# Patient Record
Sex: FEMALE | Race: OTHER | Employment: OTHER | URBAN - METROPOLITAN AREA
[De-identification: names, ages, dates, MRNs, and addresses within clinical notes are randomized per-mention and may not be internally consistent; named-entity substitution may affect disease eponyms.]

---

## 2019-11-18 ENCOUNTER — HOSPITAL ENCOUNTER (OUTPATIENT)
Dept: LAB | Age: 84
Discharge: HOME | End: 2019-11-18
Payer: MEDICARE

## 2019-11-18 ENCOUNTER — OFFICE VISIT (OUTPATIENT)
Dept: FAMILY MEDICINE CLINIC | Age: 84
End: 2019-11-18

## 2019-11-18 VITALS
DIASTOLIC BLOOD PRESSURE: 66 MMHG | SYSTOLIC BLOOD PRESSURE: 130 MMHG | HEIGHT: 59 IN | BODY MASS INDEX: 26.25 KG/M2 | HEART RATE: 70 BPM | WEIGHT: 130.2 LBS | OXYGEN SATURATION: 98 % | TEMPERATURE: 97.5 F | RESPIRATION RATE: 16 BRPM

## 2019-11-18 DIAGNOSIS — L84 CORN: ICD-10-CM

## 2019-11-18 DIAGNOSIS — Z95.0 PACEMAKER: ICD-10-CM

## 2019-11-18 DIAGNOSIS — R35.0 URINARY FREQUENCY: ICD-10-CM

## 2019-11-18 DIAGNOSIS — R76.8 ELEVATED ANTINUCLEAR ANTIBODY (ANA) LEVEL: ICD-10-CM

## 2019-11-18 DIAGNOSIS — I73.9 PERIPHERAL VASCULAR DISEASE (HCC): ICD-10-CM

## 2019-11-18 DIAGNOSIS — N39.41 URGE INCONTINENCE OF URINE: ICD-10-CM

## 2019-11-18 DIAGNOSIS — R52 PAIN OF MULTIPLE SITES: ICD-10-CM

## 2019-11-18 DIAGNOSIS — M54.17 LUMBOSACRAL RADICULOPATHY: ICD-10-CM

## 2019-11-18 DIAGNOSIS — M51.36 LUMBAR DEGENERATIVE DISC DISEASE: ICD-10-CM

## 2019-11-18 DIAGNOSIS — M21.961 DEFORMITY OF RIGHT FOOT: ICD-10-CM

## 2019-11-18 DIAGNOSIS — M79.604 PAIN OF RIGHT LOWER EXTREMITY: ICD-10-CM

## 2019-11-18 DIAGNOSIS — Z86.73 H/O: STROKE: ICD-10-CM

## 2019-11-18 DIAGNOSIS — Z87.81 H/O COMPRESSION FRACTURE OF SPINE: ICD-10-CM

## 2019-11-18 DIAGNOSIS — R35.0 URINARY FREQUENCY: Primary | ICD-10-CM

## 2019-11-18 DIAGNOSIS — H35.30 MACULAR DEGENERATION OF RIGHT EYE, UNSPECIFIED TYPE: ICD-10-CM

## 2019-11-18 DIAGNOSIS — Z79.01 CURRENT USE OF LONG TERM ANTICOAGULATION: ICD-10-CM

## 2019-11-18 DIAGNOSIS — Z87.39 H/O FIBROMYALGIA: ICD-10-CM

## 2019-11-18 DIAGNOSIS — I48.20 CHRONIC A-FIB (HCC): ICD-10-CM

## 2019-11-18 DIAGNOSIS — Z90.49 H/O TOTAL COLECTOMY: ICD-10-CM

## 2019-11-18 DIAGNOSIS — Z86.59 H/O: DEPRESSION: ICD-10-CM

## 2019-11-18 LAB
BILIRUB UR QL STRIP: NEGATIVE
ERYTHROCYTE [SEDIMENTATION RATE] IN BLOOD: 5 MM/HR (ref 0–30)
GLUCOSE UR-MCNC: NEGATIVE MG/DL
INR PPP: 1.7 (ref 0.8–1.2)
KETONES P FAST UR STRIP-MCNC: NORMAL MG/DL
PH UR STRIP: 6 [PH] (ref 4.6–8)
PROT UR QL STRIP: NORMAL
PROTHROMBIN TIME: 20 SEC (ref 11.5–15.2)
SP GR UR STRIP: 1.02 (ref 1–1.03)
UA UROBILINOGEN AMB POC: NORMAL (ref 0.2–1)
URINALYSIS CLARITY POC: NORMAL
URINALYSIS COLOR POC: NORMAL
URINE BLOOD POC: NORMAL
URINE LEUKOCYTES POC: NORMAL
URINE NITRITES POC: NEGATIVE

## 2019-11-18 PROCEDURE — 85610 PROTHROMBIN TIME: CPT

## 2019-11-18 PROCEDURE — 86038 ANTINUCLEAR ANTIBODIES: CPT

## 2019-11-18 PROCEDURE — 36415 COLL VENOUS BLD VENIPUNCTURE: CPT

## 2019-11-18 PROCEDURE — 87086 URINE CULTURE/COLONY COUNT: CPT

## 2019-11-18 PROCEDURE — 85652 RBC SED RATE AUTOMATED: CPT

## 2019-11-18 RX ORDER — BISMUTH SUBSALICYLATE 262 MG
1 TABLET,CHEWABLE ORAL DAILY
COMMUNITY

## 2019-11-18 RX ORDER — ACETAMINOPHEN 500 MG
500 TABLET ORAL DAILY
COMMUNITY
End: 2020-01-01 | Stop reason: SDUPTHER

## 2019-11-18 RX ORDER — WARFARIN SODIUM 5 MG/1
TABLET ORAL
COMMUNITY
Start: 2019-09-26 | End: 2019-11-18 | Stop reason: SDUPTHER

## 2019-11-18 RX ORDER — WARFARIN SODIUM 5 MG/1
5 TABLET ORAL DAILY
Qty: 30 TAB | Refills: 2 | Status: SHIPPED | OUTPATIENT
Start: 2019-11-18 | End: 2019-11-25 | Stop reason: SDUPTHER

## 2019-11-18 RX ORDER — DICLOFENAC SODIUM 10 MG/G
4 GEL TOPICAL DAILY
COMMUNITY
Start: 2019-10-03

## 2019-11-18 RX ORDER — FLUTICASONE PROPIONATE 50 MCG
2 SPRAY, SUSPENSION (ML) NASAL DAILY
COMMUNITY
Start: 2019-09-26 | End: 2020-01-01

## 2019-11-18 NOTE — PATIENT INSTRUCTIONS
Topiramate (By mouth) Topiramate (toe-PIR-a-mate) Treats and prevents seizures, and helps prevent migraine headaches. Brand Name(s): Qudexy XR, Topamax, Trokendi XR There may be other brand names for this medicine. When This Medicine Should Not Be Used: This medicine is not right for everyone. Do not use it if you had an allergic reaction to topiramate, or if you are pregnant. How to Use This Medicine:  
Capsule, Long Acting Capsule, Tablet · Take your medicine as directed. Your dose may need to be changed several times to find what works best for you. · Tablet: Swallow whole. Do not break, crush, or chew the tablet. It has a very bitter taste. · Capsule or extended-release capsule: Do not crush or chew the capsule. Swallow whole or open the capsule and pour the medicine into a small amount (1 teaspoon) of soft food, such as applesauce. Swallow the mixture right away without chewing. Do not store the mixture for use at a later time. · Drink extra fluids so you will urinate more often and help prevent kidney problems. · This medicine should come with a Medication Guide. Ask your pharmacist for a copy if you do not have one. · Missed dose: Take a dose as soon as you remember. If it is almost time for your next dose, wait until then and take a regular dose. Do not take extra medicine to make up for a missed dose. If you miss a dose or forget to use your medicine, use it as soon as you can. If your next regular dose of Topamax® is less than 6 hours away, wait until then to use the medicine and skip the missed dose. If you miss more than 1 dose of Topamax®, call your doctor for instructions. · Store the medicine in a closed container at room temperature, away from heat, moisture, and direct light. Drugs and Foods to Avoid: Ask your doctor or pharmacist before using any other medicine, including over-the-counter medicines, vitamins, and herbal products. · Do not drink alcohol with Qudexy XR or Topamax®. Do not drink alcohol for 6 hours before and 6 hours after you take the Trokendi XR capsule. · Some medicines can affect how topiramate works. Tell your doctor if you are using acetazolamide, dichlorphenamide, dichloralphenazone, digoxin, lithium, metformin, zonisamide, other medicine for seizures (such as carbamazepine, phenytoin, valproic acid), or birth control pills. · Tell your doctor if you are using any medicine that makes you sleepy, such as allergy medicine or narcotic pain medicine. Warnings While Using This Medicine: · It is not safe to take this medicine during pregnancy. It could harm an unborn baby. Tell your doctor right away if you become pregnant. · Tell your doctor if you are breastfeeding, or if you have kidney disease, liver disease, glaucoma, lung or breathing problems, osteoporosis, or a history of depression or mood disorders. Tell your doctor if you are on a ketogenic diet (high in fat and low in carbohydrates). · This medicine may cause the following problems: ¨ Eye pain or vision changes, including glaucoma ¨ Changes in body temperature ¨ Metabolic acidosis (too much acid in the blood) ¨ Kidney stones · This medicine may increase depression or thoughts of suicide. Tell your doctor right away if you start to feel more depressed or think about hurting yourself. · This medicine may make you dizzy, drowsy, or tired. Do not drive or do anything else that could be dangerous until you know how this medicine affects you. · Do not stop using this medicine suddenly. Your doctor will need to slowly decrease your dose before you stop it completely. · Your doctor will do lab tests at regular visits to check on the effects of this medicine. Keep all appointments. · Keep all medicine out of the reach of children. Never share your medicine with anyone. Possible Side Effects While Using This Medicine: Call your doctor right away if you notice any of these side effects: · Allergic reaction: Itching or hives, swelling in your face or hands, swelling or tingling in your mouth or throat, chest tightness, trouble breathing · Bloody or cloudy urine, painful urination, sudden lower back or stomach pain · Changes in vision, eye pain · Confusion, problems with walking, clumsiness, dizziness, or trouble talking, concentrating, or remembering · Feeling agitated, depressed, nervous, or irritable, thoughts of hurting yourself or others, unusual mood or behavior · Fever, decreased sweating · Numbness, tingling, or burning pain in your hands, arms, legs, or feet · Rapid, deep breathing, loss of appetite, fast or uneven heartbeat · Vomiting, unusual drowsiness, tiredness, or weakness If you notice these less serious side effects, talk with your doctor: · Change in taste · Nausea, diarrhea · Stuffy or runny nose · Weight loss If you notice other side effects that you think are caused by this medicine, tell your doctor. Call your doctor for medical advice about side effects. You may report side effects to FDA at 0-824-FDA-8664 © 2017 [facility]– [place] Information is for End User's use only and may not be sold, redistributed or otherwise used for commercial purposes. The above information is an  only. It is not intended as medical advice for individual conditions or treatments. Talk to your doctor, nurse or pharmacist before following any medical regimen to see if it is safe and effective for you. Atrial Fibrillation: Care Instructions Your Care Instructions Atrial fibrillation is an irregular and often fast heartbeat. Treating this condition is important for several reasons. It can cause blood clots, which can travel from your heart to your brain and cause a stroke. If you have a fast heartbeat, you may feel lightheaded, dizzy, and weak.  An irregular heartbeat can also increase your risk for heart failure. Atrial fibrillation is often the result of another heart condition, such as high blood pressure or coronary artery disease. Making changes to improve your heart condition will help you stay healthy and active. Follow-up care is a key part of your treatment and safety. Be sure to make and go to all appointments, and call your doctor if you are having problems. It's also a good idea to know your test results and keep a list of the medicines you take. How can you care for yourself at home? Medicines 
  · Take your medicines exactly as prescribed. Call your doctor if you think you are having a problem with your medicine. You will get more details on the specific medicines your doctor prescribes.  
  · If your doctor has given you a blood thinner to prevent a stroke, be sure you get instructions about how to take your medicine safely. Blood thinners can cause serious bleeding problems.  
  · Do not take any vitamins, over-the-counter drugs, or herbal products without talking to your doctor first.  
 Lifestyle changes 
  · Do not smoke. Smoking can increase your chance of a stroke and heart attack. If you need help quitting, talk to your doctor about stop-smoking programs and medicines. These can increase your chances of quitting for good.  
  · Eat a heart-healthy diet.  
  · Stay at a healthy weight. Lose weight if you need to.  
  · Limit alcohol to 2 drinks a day for men and 1 drink a day for women. Too much alcohol can cause health problems.  
  · Avoid colds and flu. Get a pneumococcal vaccine shot. If you have had one before, ask your doctor whether you need another dose. Get a flu shot every year. If you must be around people with colds or flu, wash your hands often. Activity 
  · If your doctor recommends it, get more exercise. Walking is a good choice. Bit by bit, increase the amount you walk every day.  Try for at least 30 minutes on most days of the week. You also may want to swim, bike, or do other activities. Your doctor may suggest that you join a cardiac rehabilitation program so that you can have help increasing your physical activity safely.  
  · Start light exercise if your doctor says it is okay. Even a small amount will help you get stronger, have more energy, and manage stress. Walking is an easy way to get exercise. Start out by walking a little more than you did in the hospital. Gradually increase the amount you walk.  
  · When you exercise, watch for signs that your heart is working too hard. You are pushing too hard if you cannot talk while you are exercising. If you become short of breath or dizzy or have chest pain, sit down and rest immediately.  
  · Check your pulse regularly. Place two fingers on the artery at the palm side of your wrist, in line with your thumb. If your heartbeat seems uneven or fast, talk to your doctor. When should you call for help? Call 911 anytime you think you may need emergency care. For example, call if: 
  · You have symptoms of a heart attack. These may include: 
? Chest pain or pressure, or a strange feeling in the chest. 
? Sweating. ? Shortness of breath. ? Nausea or vomiting. ? Pain, pressure, or a strange feeling in the back, neck, jaw, or upper belly or in one or both shoulders or arms. ? Lightheadedness or sudden weakness. ? A fast or irregular heartbeat. After you call 911, the  may tell you to chew 1 adult-strength or 2 to 4 low-dose aspirin. Wait for an ambulance. Do not try to drive yourself.  
  · You have symptoms of a stroke. These may include: 
? Sudden numbness, tingling, weakness, or loss of movement in your face, arm, or leg, especially on only one side of your body. ? Sudden vision changes. ? Sudden trouble speaking. ? Sudden confusion or trouble understanding simple statements. ? Sudden problems with walking or balance. ? A sudden, severe headache that is different from past headaches.  
  · You passed out (lost consciousness).  
 Call your doctor now or seek immediate medical care if: 
  · You have new or increased shortness of breath.  
  · You feel dizzy or lightheaded, or you feel like you may faint.  
  · Your heart rate becomes irregular.  
  · You can feel your heart flutter in your chest or skip heartbeats. Tell your doctor if these symptoms are new or worse.  
 Watch closely for changes in your health, and be sure to contact your doctor if you have any problems. Where can you learn more? Go to http://pedro-abiodun.info/. Enter U020 in the search box to learn more about \"Atrial Fibrillation: Care Instructions. \" Current as of: April 9, 2019 Content Version: 12.2 © 1960-0259 [employer]. Care instructions adapted under license by [employer] (which disclaims liability or warranty for this information). If you have questions about a medical condition or this instruction, always ask your healthcare professional. [address] any warranty or liability for your use of this information. Heart and Pacemaker: Anatomy Sketch Current as of: April 9, 2019 Content Version: 12.2 © 9925-7722 [employer]. Care instructions adapted under license by [employer] (which disclaims liability or warranty for this information). If you have questions about a medical condition or this instruction, always ask your healthcare professional. [address] any warranty or liability for your use of this information. Duloxetine (By mouth) Duloxetine (doo-LOX-e-teen) Treats depression, anxiety, diabetic peripheral neuropathy, fibromyalgia, and chronic muscle or bone pain. This medicine is an SSNRI. Brand Name(s): Cymbalta, DermacinRx DPN Aleene Lob There may be other brand names for this medicine. When This Medicine Should Not Be Used: This medicine is not right for everyone. Do not use it if you had an allergic reaction to duloxetine. How to Use This Medicine:  
Capsule, Delayed Release Capsule · Take your medicine as directed. Your dose may need to be changed several times to find what works best for you. · Delayed-release capsule: Swallow the capsule whole. Do not crush, chew, break, or open it. · This medicine should come with a Medication Guide. Ask your pharmacist for a copy if you do not have one. · Missed dose: Take a dose as soon as you remember. If it is almost time for your next dose, wait until then and take a regular dose. Do not take extra medicine to make up for a missed dose. · Store the medicine in a closed container at room temperature, away from heat, moisture, and direct light. Drugs and Foods to Avoid: Ask your doctor or pharmacist before using any other medicine, including over-the-counter medicines, vitamins, and herbal products. · Do not take duloxetine if you have used an MAO inhibitor (MAOI) within the past 14 days. Do not start taking an MAO inhibitor within 5 days of stopping duloxetine. · Some medicines can affect how duloxetine works. Tell your doctor if you are using any of the following: 
¨ Buspirone, cimetidine, ciprofloxacin, enoxacin, fentanyl, lithium, Karen's wort, theophylline, tramadol, tryptophan, or warfarin ¨ Amphetamines ¨ Blood pressure medicine ¨ Diuretic (water pill) ¨ Medicine for heart rhythm problems (including flecainide, propafenone, quinidine) ¨ Medicine to treat migraine headaches (including triptans) ¨ NSAID pain or arthritis medicine (including aspirin, celecoxib, diclofenac, ibuprofen, naproxen) ¨ Other medicine to treat depression or mood disorders (including amitriptyline, desipramine, fluoxetine, imipramine, nortriptyline, paroxetine) ¨ Phenothiazine medicine (including thioridazine) · Tell your doctor if you use anything else that makes you sleepy. Some examples are allergy medicine, narcotic pain medicine, and alcohol. · Do not drink alcohol while you are using this medicine. Warnings While Using This Medicine: · Tell your doctor if you are pregnant or breastfeeding, or if you have kidney disease, liver disease, diabetes, digestion problems, glaucoma, heart disease, high or low blood pressure, or problems with urination. Tell your doctor if you smoke or you have a history of seizures, or drug or alcohol addiction. · This medicine may cause the following problems:  
¨ Serious liver problems ¨ Serotonin syndrome (more likely when used with certain other medicines) ¨ Increased risk of bleeding problems ¨ Serious skin reactions ¨ Low sodium levels in the blood · This medicine can increase thoughts of suicide. Tell your doctor right away if you start to feel depressed and have thoughts about hurting yourself. · This medicine can cause changes in your blood pressure. This may make you dizzy or drowsy. Do not drive or do anything that could be dangerous until you know how this medicine affects you. Stand up slowly to avoid falls. · Do not stop using this medicine suddenly. Your doctor will need to slowly decrease your dose before you stop it completely. · Your doctor will check your progress and the effects of this medicine at regular visits. Keep all appointments. · Keep all medicine out of the reach of children. Never share your medicine with anyone. Possible Side Effects While Using This Medicine:  
Call your doctor right away if you notice any of these side effects: · Allergic reaction: Itching or hives, swelling in your face or hands, swelling or tingling in your mouth or throat, chest tightness, trouble breathing · Anxiety, restlessness, fever, fast heartbeat, sweating, muscle spasms, diarrhea, seeing or hearing things that are not there · Blistering, peeling, red skin rash · Confusion, weakness, muscle twitching · Dark urine or pale stools, nausea, vomiting, loss of appetite, stomach pain, yellow skin or eyes · Decrease in how much or how often you urinate · Eye pain, vision changes, seeing halos around lights · Feeling more energetic than usual 
· Lightheadedness, dizziness, or fainting · Unusual moods or behaviors, worsening depression, thoughts about hurting yourself, trouble sleeping · Unusual bleeding or bruising If you notice these less serious side effects, talk with your doctor: · Decrease in appetite or weight · Dry mouth, constipation, mild nausea · Unusual drowsiness, sleepiness, or tiredness If you notice other side effects that you think are caused by this medicine, tell your doctor. Call your doctor for medical advice about side effects. You may report side effects to FDA at 4-712-FDA-2584 © 2017 [facility] Information is for End User's use only and may not be sold, redistributed or otherwise used for commercial purposes. The above information is an  only. It is not intended as medical advice for individual conditions or treatments. Talk to your doctor, nurse or pharmacist before following any medical regimen to see if it is safe and effective for you.

## 2019-11-18 NOTE — PROGRESS NOTES
HISTORY OF PRESENT ILLNESS Tarah Luis is a 80 y.o. female. HPI: new patient. Multiple medical problem. Accompanied with her daughter in law. Moved a week ago from [place]. Some brief summery of last note from prior PCP. Still no formal records available. Her  passed away a week ago and he was primary care giver for pt so now she moved with her son and daughter in law. 
 said she has urinary frequency. No urgency or burning but having incontinences . Said she always had weak bladder. Not taking any medication for current problem UA done during office visit showed +1 leukocytes and blood. Negative nitrates . Will send urine for culture . Will observe for now and if needed can start ditropan. Pt is not a good historian. Now on coumadin. Per prior records noted she has pacemaker and h/o a.fib. Prior h/o stroke. No residual weakness in ext. Last coumadin level was checked a week ago. Now taking 7.5 mg twice a week and rest of the week 5 mg. Will recheck labs. Also done cardiology referral to get establish care. She has rt foot deformity. Callus under 2nd and 3rd toe. Asking for podiatry referral.  
Also chronic lower back pain and radiculopathy over rt lower ext. Review MRI of lumbar spine report which was provided by pt during visit. Also prior h/o compression fracture L1,L2 from fall. Not sure she is on any treatment for osteoporosis or any prior dexa scan done. Will obtain records. Mean time also sending her to rheumatology as she is feeling pain over multiple sites. Also prior h/o elevated TERESITA. During visit no joint pain or swelling. Will repeat labs as well. Some fatigue but said not unusual . Also per chart noted carotid artery disease. She does not recall any carotid ultrasound. Again will obtain records. I do not see she is on statin. Prior h/o stroke? ? As prevention. Not sure why ?? Noted h/o depression and fibromyalgia. Some sleep discomfort due to recently lost her . Will observe. Not on any medication for depression. Discussed cymbalta. She will think about it. Also c/o rt lower ext radiculopathy. Also discussed that can be help with cymbalta. ?? 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 Surgery due to h/o colitis. Agree to reestablish care with colo rectal specialist.  
 
Visit Vitals /66 (BP 1 Location: Left arm, BP Patient Position: Sitting) Pulse 70 Temp 97.5 °F (36.4 °C) (Oral) Resp 16 Ht 4' 11\" (1.499 m) Wt 130 lb 3.2 oz (59.1 kg) SpO2 98% BMI 26.30 kg/m² Review medication list, vitals, problem list,allergies. Sitting on a chair. Not in an acute distress. Denies any headache, dizziness, no chest pain or trouble breathing, no arm or leg weakness. No nausea or vomiting, no weight or appetite changes, no mood changes . No urine or bowel complains, no palpitation, no diaphoresis. No abdominal pain. No cold or cough. No leg swelling. No fever. No sleep trouble. ROS: see HPI Physical Exam  
Constitutional: No distress. Neck: No thyromegaly present. Cardiovascular: Normal heart sounds. Pulmonary/Chest: No respiratory distress. She has no wheezes. Abdominal: Soft. There is no tenderness. Musculoskeletal: She exhibits no edema. generalize  discomfort over lumbosacral spine. No point tenderness. Bilateral paraspinal muscle discomfort present both side. ROM limited. ambulating with support Lymphadenopathy:  
  She has no cervical adenopathy. Neurological: She is alert. Psychiatric: Her behavior is normal.  
 
 
ASSESSMENT and PLAN 
  ICD-10-CM ICD-9-CM 1. Urinary frequency: UA did not show acute signs of infection. Will send for culture. Mean time drink more fluid. R35.0 788.41 AMB POC URINALYSIS DIP STICK AUTO W/O MICRO  
   CULTURE, URINE  
2.  Urge incontinence of urine N39.41 788.31   
 3. Peripheral vascular disease [facility]): for now will obtain prior ultrasound records. I73.9 443.9 REFERRAL TO CARDIOLOGY  
 carotid artery disorder 4. Chronic a-fib: on anticoagulation. Will be sending her to cardiology as also has pacemaker. Will also obtain prior records. Checking PT/ INR. Last check was one month ago was 1.7. I48.20 427.31 REFERRAL TO CARDIOLOGY PROTHROMBIN TIME + INR  
5. H/O: stroke: not on statin. No residual weakness. Will obtain records to obtain reason why not statin for secondary prevention. Z86.73 V12.54 REFERRAL TO CARDIOLOGY 6. Pacemaker: last was checked in April. She has card and now time to recheck. Will follow cardiology recommendations. Z95.0 V45.01 REFERRAL TO CARDIOLOGY 7. Current use of long term anticoagulation Z79.01 V58.61 REFERRAL TO CARDIOLOGY PROTHROMBIN TIME + INR 8. Lumbar degenerative disc disease: for now sending to spine specialist as chronic pain and radiculopathy. For now also discussed Topamax and Cymbalta for symptomatic treatment. . She will think about it. M51.36 722.52 REFERRAL TO SPINE SURGERY  
 h/o compression fracture L1, L2.   
9. Pain of multiple sites R52 780.96 REFERRAL TO RHEUMATOLOGY ANTINUCLEAR ANTIBODIES, IFA  
   SED RATE (ESR)  
 h/o elevated TERESITA. multiple compression fracture over her lumbar spine. 10. Pain of right lower extremity: ? Radiculopathy. For now discussed heating pad over lumbar spine. M79.604 729.5 REFERRAL TO PODIATRY REFERRAL TO RHEUMATOLOGY 11. Deformity of right foot: sending to podiatry. Inverted toes and callus below toe. M21.961 736.70 REFERRAL TO PODIATRY prior procedures done  per prior records. no full record available . 12. Lumbosacral radiculopathy: symptomatic treatment discussed . see above. M54.17 724.4   
 rt lower ext burning. started 2  
13. [place]: sending to podiatry. L84 700 REFERRAL TO PODIATRY  
 rt foot. 14. H/O total colectomy: due to colitis. For now sending to colorectal surgeon to reestablish care locally. Z90.49 V45.89 REFERRAL TO COLON AND RECTAL SURGERY  
 has colostomy  bag 15. Macular degeneration of right eye, unspecified type: sending to ophthalmology to establish care. H35.30 362.50 REFERRAL TO OPHTHALMOLOGY 16. H/O fibromyalgia: was given option of cymbalta. She will think about it. Z87.39 V13.59 REFERRAL TO RHEUMATOLOGY 16. H/O: depression: recently lost her . For now will observe. Discussed Cymbalta. She will think about it  Z86.59 V11.8 18. Elevated antinuclear antibody (TERESITA) level: repeat labs. Per prior records past history of elevated TERESITA>  R76.8 795.79 REFERRAL TO RHEUMATOLOGY ANTINUCLEAR ANTIBODIES, IFA  
   SED RATE (ESR) 19. H/O compression fracture of spine: for now sending to spine center. Mean time as needed otc medication. Heating pad. Z87.81 V15.51 REFERRAL TO RHEUMATOLOGY Pt understood and agree with the plan Review HM Follow-up and Dispositions · Return in about 1 month (around 12/18/2019).

## 2019-11-19 LAB — ANA TITR SER IF: NEGATIVE {TITER}

## 2019-11-19 NOTE — PROGRESS NOTES
Called and spoke with Manolo Calzada (on [facility]) and she verbalized patient's name and date of birth. She verbalized understanding of Coumadin 7.5 mg M, W, F and 5 mg remaining days. Referral to coumadin clinic for further monitoring.

## 2019-11-19 NOTE — PROGRESS NOTES
Ask pt to take 7.5 mg coumadin 3 times a week and rest of the week 5 mg. Monday, Wednesday and Friday. Also recheck in a week. Please try to get sooner appt with cardiology for pacemaker check and coumadin check. Thanks.

## 2019-11-20 ENCOUNTER — TELEPHONE (OUTPATIENT)
Dept: FAMILY MEDICINE CLINIC | Age: 84
End: 2019-11-20

## 2019-11-20 LAB
BACTERIA SPEC CULT: ABNORMAL
SERVICE CMNT-IMP: ABNORMAL

## 2019-11-20 NOTE — TELEPHONE ENCOUNTER
Pt's coumadin has been changed to 7.5 mg mon -wed and fri and 5mg on other  Days . Need to call pharmacy to change dose for medicare purposes and the pt's daughter in law would like 80 day supply. Pt won't see cardiologist for over a month.

## 2019-11-25 ENCOUNTER — TELEPHONE (OUTPATIENT)
Dept: FAMILY MEDICINE CLINIC | Age: 84
End: 2019-11-25

## 2019-11-25 DIAGNOSIS — Z79.01 LONG TERM (CURRENT) USE OF ANTICOAGULANTS: Primary | ICD-10-CM

## 2019-11-25 RX ORDER — WARFARIN 2.5 MG/1
2.5 TABLET ORAL DAILY
Qty: 48 TAB | Refills: 0 | Status: SHIPPED | OUTPATIENT
Start: 2019-11-25 | End: 2020-01-01

## 2019-11-25 RX ORDER — WARFARIN SODIUM 5 MG/1
5 TABLET ORAL DAILY
Qty: 90 TAB | Refills: 2 | Status: SHIPPED | OUTPATIENT
Start: 2019-11-25 | End: 2020-01-01

## 2019-11-25 NOTE — PROGRESS NOTES
Called patient and left message for daughter in Romayne Riding to please call office at convenience regarding results.

## 2019-11-25 NOTE — PROGRESS NOTES
Called patient and left message for daughter in law to please call office at convenience regarding results.

## 2019-11-25 NOTE — TELEPHONE ENCOUNTER
Spoke with patient's daughter in law and she verbalized understanding of Rx to pharmacy and to re-check in 1 week. She indicated she will get patient to lab as close to the date as possible due to holiday travel.

## 2019-12-05 ENCOUNTER — HOSPITAL ENCOUNTER (OUTPATIENT)
Dept: LAB | Age: 84
Discharge: HOME | End: 2019-12-05
Payer: MEDICARE

## 2019-12-05 DIAGNOSIS — Z79.01 LONG TERM (CURRENT) USE OF ANTICOAGULANTS: ICD-10-CM

## 2019-12-05 LAB
INR PPP: 2.1 (ref 0.8–1.2)
PROTHROMBIN TIME: 23.5 SEC (ref 11.5–15.2)

## 2019-12-05 PROCEDURE — 85610 PROTHROMBIN TIME: CPT

## 2019-12-05 PROCEDURE — 36415 COLL VENOUS BLD VENIPUNCTURE: CPT

## 2019-12-06 NOTE — PROGRESS NOTES
INR in theraputic range. Continue current dose of coumadin. Now noted from the chart she has an appt with cardiology on dec 11th. Follow their recommendations after their appt regarding coumadin management.

## 2019-12-10 NOTE — PROGRESS NOTES
Call received from patient's daughter-in-law, Essence Hobson. She was advised that patient's INR was in theraputic range. Patient to continue current dose of coumadin and to keep appt with cardiology on dec 11th. Follow their recommendations after their appt regarding coumadin management. Essence Hobson voices understanding.

## 2019-12-11 ENCOUNTER — OFFICE VISIT (OUTPATIENT)
Dept: CARDIOLOGY CLINIC | Age: 84
End: 2019-12-11

## 2019-12-11 ENCOUNTER — CLINICAL SUPPORT (OUTPATIENT)
Dept: CARDIOLOGY CLINIC | Age: 84
End: 2019-12-11

## 2019-12-11 VITALS
OXYGEN SATURATION: 96 % | BODY MASS INDEX: 26.61 KG/M2 | HEIGHT: 59 IN | HEART RATE: 78 BPM | WEIGHT: 132 LBS | DIASTOLIC BLOOD PRESSURE: 90 MMHG | SYSTOLIC BLOOD PRESSURE: 142 MMHG

## 2019-12-11 DIAGNOSIS — Z95.0 PACEMAKER: Primary | ICD-10-CM

## 2019-12-11 DIAGNOSIS — I48.91 ATRIAL FIBRILLATION, UNSPECIFIED TYPE (HCC): ICD-10-CM

## 2019-12-11 DIAGNOSIS — M47.9 OSTEOARTHRITIS OF SPINE, UNSPECIFIED SPINAL OSTEOARTHRITIS COMPLICATION STATUS, UNSPECIFIED SPINAL REGION: ICD-10-CM

## 2019-12-11 DIAGNOSIS — Z79.01 ANTICOAGULANT LONG-TERM USE: ICD-10-CM

## 2019-12-11 NOTE — PROGRESS NOTES
Barry Sy presents today for Chief Complaint Patient presents with  Irregular Heart Beat REFERRED BY PCP. ESTABLISHING WITH NEW CARDIOLOGIST. PREVIOUS CARDIOLOGIST IN [place]. [employer] PRESENT  Shortness of Breath Barry Sy preferred language for health care discussion is english/other. Is someone accompanying this pt? yes Is the patient using any DME equipment during OV? no 
 
Depression Screening: 
3 most recent PHQ Screens 11/18/2019 Little interest or pleasure in doing things Not at all Feeling down, depressed, irritable, or hopeless Not at all Total Score PHQ 2 0 Learning Assessment: 
Learning Assessment 11/18/2019 PRIMARY LEARNER Patient HIGHEST LEVEL OF EDUCATION - PRIMARY LEARNER  GRADUATED HIGH SCHOOL OR GED  
BARRIERS PRIMARY LEARNER [id number] [facility] CAREGIVER Yes CO-LEARNER NAME Monty Pang / Daughter-in- law R Carolyn Miller 75 SOME COLLEGE  
BARRIERS CO-LEARNER NONE PRIMARY LANGUAGE ENGLISH  
PRIMARY LANGUAGE CO-LEARNER ENGLISH  NEED No  
LEARNER PREFERENCE PRIMARY LISTENING  
  READING  
LEARNER PREFERENCE CO-LEARNER LISTENING  
LEARNING SPECIAL TOPICS No  
ANSWERED BY patient RELATIONSHIP SELF Abuse Screening: 
Abuse Screening Questionnaire 11/18/2019 Do you ever feel afraid of your partner? Randal Lek Are you in a relationship with someone who physically or mentally threatens you? Randal Lek Is it safe for you to go home? Phani Garrett Fall Risk Fall Risk Assessment, last 12 mths 11/18/2019 Able to walk? Yes Fall in past 12 months? Yes Fall with injury? Yes  
Number of falls in past 12 months 6 Fall Risk Score 7 Pt currently taking Anticoagulant therapy? yes Coordination of Care: 1. Have you been to the ER, urgent care clinic since your last visit? Hospitalized since your last visit? no 
 
2.  Have you seen or consulted any other health care providers outside of the [address] [place] [address] since your last visit? Include any pap smears or colon screening. yes

## 2019-12-11 NOTE — PROGRESS NOTES
HPI: An 80-year old female here to establish care. She is accompanied by her daughter-in-law. I also care for her son. She has no new chest pain, dyspnea, PND, orthopnea or edema. Her  passed away earlier this year and she has moved to the area and living now with her son and daughter-in-law. She is doing well. She normally takes coumadin without bleeding issues. Impression/Plan: 1. Single chamber [address] 233 pacemaker approaching VOLODYMYR but not yet triggered. She paces approximately 45%. 2. Chronic atrial fibrillation on coumadin. 3. History of echocardiogram historically with normal function. 4. History of fibromyalgia. 5. Dyslipidemia. 6. Remote TIA. 7. History of diverticulitis and colectomy. 8. Gastroesophageal reflux disease. At this point we will plan to check her device monthly until she requires changeout. At that time we can switch to a [employer] and set up regular home checks. She is also on coumadin and we will plan to keep her INR between 2-3 and will set her up with the coumadin clinic. All questions answered. I will make arrangements. No past medical history on file. Current Outpatient Medications Medication Sig Dispense Refill  warfarin (COUMADIN) 2.5 mg tablet Take 1 Tab by mouth daily. Take one tab Monday-Wednesday along with 5 mg . Total 7.5 mg Monday- Wednesday. . Other days she will be on 5 mg 48 Tab 0  
 warfarin (COUMADIN) 5 mg tablet Take 1 Tab by mouth daily. Take Monday-Wednesday 7.5 mg and rest of the week 5 mg. 90 Tab 2  
 diclofenac (VOLTAREN) 1 % gel  fluticasone propionate (FLONASE) 50 mcg/actuation nasal spray  acetaminophen (TYLENOL EXTRA STRENGTH) 500 mg tablet Take  by mouth every six (6) hours as needed for Pain.  diphenhydramine HCl (BENADRYL PO) Take  by mouth.  multivitamin (ONE A DAY) tablet Take 1 Tab by mouth daily.  cholecalciferol, vitamin D3, (VITAMIN D3 PO) Take  by mouth.  OTHER OTC vision supplement Social History 
 reports that she has never smoked. She has never used smokeless tobacco. 
 reports no history of alcohol use. Family History 
family history includes Heart Failure in her father; Stroke in her mother. Review of Systems Except as stated above include: 
Constitutional: Negative for fever, chills and malaise/fatigue. HEENT: No congestion or recent URI. Gastrointestinal: No nausea, vomiting, abdominal pain, bloody stools. Pulmonary:  Negative except as stated above. Cardiac:  Negative except as stated above. Musculoskeletal: Negative except as stated above. Neurological:  No localized symptoms. Skin:  Negative except as stated above. Psych:  Negative except as stated above. Endocrine:  Negative except as stated above. PHYSICAL EXAM 
BP Readings from Last 3 Encounters:  
12/11/19 142/90  
11/18/19 130/66 Pulse Readings from Last 3 Encounters:  
12/11/19 78  
11/18/19 70 Wt Readings from Last 3 Encounters:  
12/11/19 59.9 kg (132 lb)  
11/18/19 59.1 kg (130 lb 3.2 oz) General:   Well developed, well groomed. Head/Neck:   No jugular venous distention No carotid bruits. No evidence of xanthelasma. Lungs:   No respiratory distress. Clear bilaterally. Heart:    Regular rate and rhythm. Normal S1/S2. Pacer pocket intact. Palpation of heart with normal point of maximum impulse. No significant murmurs, rubs or gallops. Abdomen:   Soft and nontender. No palpable abdominal mass or bruits. Extremities:   Intact peripheral pulses. No significant edema. Neurological:   Alert and oriented to person, place, time. No focal neurological deficit visually. Skin:   No obvious rash Blood Pressure Metric: 
Monitor recommended and adjustments stated if needed.

## 2019-12-27 NOTE — PROGRESS NOTES
I have personally seen and evaluated the device findings. Interrogation reviewed and I agree with assessment.     Jerson Storm

## 2020-01-01 ENCOUNTER — OFFICE VISIT (OUTPATIENT)
Dept: NEUROLOGY | Age: 85
End: 2020-01-01

## 2020-01-01 ENCOUNTER — TELEPHONE ANTICOAG (OUTPATIENT)
Dept: CARDIOLOGY CLINIC | Age: 85
End: 2020-01-01

## 2020-01-01 ENCOUNTER — HOME CARE VISIT (OUTPATIENT)
Dept: SCHEDULING | Facility: HOME HEALTH | Age: 85
End: 2020-01-01
Payer: MEDICARE

## 2020-01-01 ENCOUNTER — OFFICE VISIT (OUTPATIENT)
Dept: CARDIOLOGY CLINIC | Age: 85
End: 2020-01-01
Payer: MEDICARE

## 2020-01-01 ENCOUNTER — APPOINTMENT (OUTPATIENT)
Dept: PHYSICAL THERAPY | Age: 85
End: 2020-01-01

## 2020-01-01 ENCOUNTER — OFFICE VISIT (OUTPATIENT)
Dept: FAMILY MEDICINE CLINIC | Age: 85
End: 2020-01-01

## 2020-01-01 ENCOUNTER — VIRTUAL VISIT (OUTPATIENT)
Dept: NEUROLOGY | Age: 85
End: 2020-01-01

## 2020-01-01 ENCOUNTER — TELEPHONE (OUTPATIENT)
Dept: CARDIOLOGY CLINIC | Age: 85
End: 2020-01-01

## 2020-01-01 ENCOUNTER — TELEPHONE (OUTPATIENT)
Dept: FAMILY MEDICINE CLINIC | Age: 85
End: 2020-01-01

## 2020-01-01 ENCOUNTER — HOSPITAL ENCOUNTER (OUTPATIENT)
Dept: LAB | Age: 85
Discharge: HOME | End: 2020-12-14
Payer: MEDICARE

## 2020-01-01 ENCOUNTER — CLINICAL SUPPORT (OUTPATIENT)
Dept: CARDIOLOGY CLINIC | Age: 85
End: 2020-01-01

## 2020-01-01 ENCOUNTER — OFFICE VISIT (OUTPATIENT)
Dept: ORTHOPEDIC SURGERY | Age: 85
End: 2020-01-01

## 2020-01-01 ENCOUNTER — OFFICE VISIT (OUTPATIENT)
Dept: CARDIOLOGY CLINIC | Age: 85
End: 2020-01-01

## 2020-01-01 ENCOUNTER — HOSPITAL ENCOUNTER (OUTPATIENT)
Age: 85
Setting detail: OUTPATIENT SURGERY
Discharge: HOME | End: 2020-12-28
Attending: INTERNAL MEDICINE | Admitting: INTERNAL MEDICINE
Payer: MEDICARE

## 2020-01-01 ENCOUNTER — CLINICAL SUPPORT (OUTPATIENT)
Dept: CARDIOLOGY CLINIC | Age: 85
End: 2020-01-01
Payer: MEDICARE

## 2020-01-01 ENCOUNTER — HOME CARE VISIT (OUTPATIENT)
Dept: HOME HEALTH SERVICES | Facility: HOME HEALTH | Age: 85
End: 2020-01-01
Payer: MEDICARE

## 2020-01-01 ENCOUNTER — OFFICE VISIT (OUTPATIENT)
Dept: FAMILY MEDICINE CLINIC | Age: 85
End: 2020-01-01
Payer: MEDICARE

## 2020-01-01 ENCOUNTER — TELEPHONE (OUTPATIENT)
Dept: ORTHOPEDIC SURGERY | Age: 85
End: 2020-01-01

## 2020-01-01 ENCOUNTER — HOSPITAL ENCOUNTER (OUTPATIENT)
Dept: GENERAL RADIOLOGY | Age: 85
Discharge: HOME | End: 2020-12-14
Payer: MEDICARE

## 2020-01-01 VITALS
HEIGHT: 59 IN | WEIGHT: 119.5 LBS | BODY MASS INDEX: 24.09 KG/M2 | RESPIRATION RATE: 16 BRPM | TEMPERATURE: 98.1 F | HEART RATE: 74 BPM | SYSTOLIC BLOOD PRESSURE: 147 MMHG | OXYGEN SATURATION: 98 % | DIASTOLIC BLOOD PRESSURE: 79 MMHG

## 2020-01-01 VITALS
OXYGEN SATURATION: 97 % | BODY MASS INDEX: 23.99 KG/M2 | TEMPERATURE: 97.9 F | WEIGHT: 119 LBS | DIASTOLIC BLOOD PRESSURE: 72 MMHG | SYSTOLIC BLOOD PRESSURE: 120 MMHG | HEIGHT: 59 IN | RESPIRATION RATE: 16 BRPM | HEART RATE: 73 BPM

## 2020-01-01 VITALS
HEIGHT: 59 IN | HEART RATE: 64 BPM | SYSTOLIC BLOOD PRESSURE: 136 MMHG | DIASTOLIC BLOOD PRESSURE: 80 MMHG | TEMPERATURE: 98 F | BODY MASS INDEX: 26 KG/M2 | RESPIRATION RATE: 16 BRPM | OXYGEN SATURATION: 100 % | WEIGHT: 129 LBS

## 2020-01-01 VITALS
HEART RATE: 58 BPM | OXYGEN SATURATION: 100 % | HEIGHT: 59 IN | BODY MASS INDEX: 23.99 KG/M2 | DIASTOLIC BLOOD PRESSURE: 74 MMHG | SYSTOLIC BLOOD PRESSURE: 120 MMHG | WEIGHT: 119 LBS

## 2020-01-01 VITALS — SYSTOLIC BLOOD PRESSURE: 120 MMHG | OXYGEN SATURATION: 100 % | HEART RATE: 68 BPM | DIASTOLIC BLOOD PRESSURE: 70 MMHG

## 2020-01-01 VITALS
SYSTOLIC BLOOD PRESSURE: 134 MMHG | HEART RATE: 74 BPM | DIASTOLIC BLOOD PRESSURE: 69 MMHG | HEIGHT: 59 IN | BODY MASS INDEX: 25.4 KG/M2 | RESPIRATION RATE: 16 BRPM | TEMPERATURE: 97.5 F | OXYGEN SATURATION: 100 % | WEIGHT: 126 LBS

## 2020-01-01 VITALS — DIASTOLIC BLOOD PRESSURE: 72 MMHG | SYSTOLIC BLOOD PRESSURE: 130 MMHG | OXYGEN SATURATION: 94 % | HEART RATE: 78 BPM

## 2020-01-01 VITALS
SYSTOLIC BLOOD PRESSURE: 114 MMHG | HEART RATE: 83 BPM | DIASTOLIC BLOOD PRESSURE: 64 MMHG | WEIGHT: 119 LBS | OXYGEN SATURATION: 97 % | HEIGHT: 59 IN | BODY MASS INDEX: 23.99 KG/M2

## 2020-01-01 VITALS
TEMPERATURE: 97.1 F | HEART RATE: 78 BPM | SYSTOLIC BLOOD PRESSURE: 130 MMHG | DIASTOLIC BLOOD PRESSURE: 63 MMHG | OXYGEN SATURATION: 99 %

## 2020-01-01 VITALS
RESPIRATION RATE: 14 BRPM | TEMPERATURE: 98.6 F | HEART RATE: 61 BPM | SYSTOLIC BLOOD PRESSURE: 106 MMHG | OXYGEN SATURATION: 97 % | BODY MASS INDEX: 23.87 KG/M2 | WEIGHT: 118.38 LBS | DIASTOLIC BLOOD PRESSURE: 70 MMHG | HEIGHT: 59 IN

## 2020-01-01 VITALS
DIASTOLIC BLOOD PRESSURE: 72 MMHG | HEART RATE: 80 BPM | TEMPERATURE: 97.7 F | SYSTOLIC BLOOD PRESSURE: 130 MMHG | OXYGEN SATURATION: 98 %

## 2020-01-01 VITALS
HEIGHT: 59 IN | BODY MASS INDEX: 25.4 KG/M2 | HEART RATE: 74 BPM | WEIGHT: 126 LBS | OXYGEN SATURATION: 93 % | DIASTOLIC BLOOD PRESSURE: 70 MMHG | SYSTOLIC BLOOD PRESSURE: 110 MMHG

## 2020-01-01 DIAGNOSIS — Z45.010 PACEMAKER AT END OF BATTERY LIFE: ICD-10-CM

## 2020-01-01 DIAGNOSIS — I10 ESSENTIAL HYPERTENSION: ICD-10-CM

## 2020-01-01 DIAGNOSIS — I48.20 CHRONIC A-FIB (HCC): Primary | ICD-10-CM

## 2020-01-01 DIAGNOSIS — I73.9 PERIPHERAL VASCULAR DISEASE (HCC): ICD-10-CM

## 2020-01-01 DIAGNOSIS — S22.000S COMPRESSION FRACTURE OF THORACIC VERTEBRA, UNSPECIFIED THORACIC VERTEBRAL LEVEL, SEQUELA: Primary | ICD-10-CM

## 2020-01-01 DIAGNOSIS — R41.844 EXECUTIVE FUNCTION DEFICIT: ICD-10-CM

## 2020-01-01 DIAGNOSIS — F01.50 MIXED DEMENTIA (HCC): Primary | ICD-10-CM

## 2020-01-01 DIAGNOSIS — I48.20 CHRONIC A-FIB (HCC): ICD-10-CM

## 2020-01-01 DIAGNOSIS — M47.9 OSTEOARTHRITIS OF SPINE, UNSPECIFIED SPINAL OSTEOARTHRITIS COMPLICATION STATUS, UNSPECIFIED SPINAL REGION: ICD-10-CM

## 2020-01-01 DIAGNOSIS — N39.41 URGE INCONTINENCE OF URINE: ICD-10-CM

## 2020-01-01 DIAGNOSIS — Z95.0 PACEMAKER: Primary | ICD-10-CM

## 2020-01-01 DIAGNOSIS — R14.0 BLOATING: ICD-10-CM

## 2020-01-01 DIAGNOSIS — Z90.49 H/O TOTAL COLECTOMY: ICD-10-CM

## 2020-01-01 DIAGNOSIS — M51.36 LUMBAR DEGENERATIVE DISC DISEASE: ICD-10-CM

## 2020-01-01 DIAGNOSIS — F81.9 LEARNING DIFFICULTY DUE TO COGNITIVE LIMITATIONS: ICD-10-CM

## 2020-01-01 DIAGNOSIS — M54.9 UPPER BACK PAIN: Primary | ICD-10-CM

## 2020-01-01 DIAGNOSIS — I48.91 ATRIAL FIBRILLATION, UNSPECIFIED TYPE (HCC): ICD-10-CM

## 2020-01-01 DIAGNOSIS — Z95.0 H/O CARDIAC PACEMAKER: ICD-10-CM

## 2020-01-01 DIAGNOSIS — R29.6 FREQUENT FALLS: ICD-10-CM

## 2020-01-01 DIAGNOSIS — F41.8 ANXIETY ABOUT HEALTH: ICD-10-CM

## 2020-01-01 DIAGNOSIS — Z95.0 PACEMAKER: ICD-10-CM

## 2020-01-01 DIAGNOSIS — Z00.00 MEDICARE ANNUAL WELLNESS VISIT, SUBSEQUENT: Primary | ICD-10-CM

## 2020-01-01 DIAGNOSIS — I49.9 IRREGULAR HEART BEAT: ICD-10-CM

## 2020-01-01 DIAGNOSIS — M48.061 SPINAL STENOSIS OF LUMBAR REGION WITHOUT NEUROGENIC CLAUDICATION: ICD-10-CM

## 2020-01-01 DIAGNOSIS — F03.90 DEMENTIA WITHOUT BEHAVIORAL DISTURBANCE, UNSPECIFIED DEMENTIA TYPE: ICD-10-CM

## 2020-01-01 DIAGNOSIS — G30.9 MIXED DEMENTIA (HCC): Primary | ICD-10-CM

## 2020-01-01 DIAGNOSIS — M25.511 RIGHT SHOULDER PAIN, UNSPECIFIED CHRONICITY: ICD-10-CM

## 2020-01-01 DIAGNOSIS — Z87.39 H/O FIBROMYALGIA: ICD-10-CM

## 2020-01-01 DIAGNOSIS — Z23 ENCOUNTER FOR IMMUNIZATION: Primary | ICD-10-CM

## 2020-01-01 DIAGNOSIS — F02.80 MIXED DEMENTIA (HCC): Primary | ICD-10-CM

## 2020-01-01 DIAGNOSIS — Z45.010 PACEMAKER AT END OF BATTERY LIFE: Primary | ICD-10-CM

## 2020-01-01 DIAGNOSIS — R47.89 WORD FINDING DIFFICULTY: ICD-10-CM

## 2020-01-01 DIAGNOSIS — Z87.81 H/O COMPRESSION FRACTURE OF SPINE: ICD-10-CM

## 2020-01-01 DIAGNOSIS — Z11.1 SCREENING FOR TUBERCULOSIS: Primary | ICD-10-CM

## 2020-01-01 DIAGNOSIS — M79.604 PAIN IN BOTH LOWER EXTREMITIES: ICD-10-CM

## 2020-01-01 DIAGNOSIS — Z86.73 H/O TIA (TRANSIENT ISCHEMIC ATTACK) AND STROKE: ICD-10-CM

## 2020-01-01 DIAGNOSIS — R41.3 MEMORY LOSS: Primary | ICD-10-CM

## 2020-01-01 DIAGNOSIS — Z79.01 LONG TERM (CURRENT) USE OF ANTICOAGULANTS: ICD-10-CM

## 2020-01-01 DIAGNOSIS — R41.3 MEMORY CHANGES: ICD-10-CM

## 2020-01-01 DIAGNOSIS — Z11.1 SCREENING FOR TUBERCULOSIS: ICD-10-CM

## 2020-01-01 DIAGNOSIS — R45.89 SYMPTOMS OF DEPRESSION: ICD-10-CM

## 2020-01-01 DIAGNOSIS — M43.16 SPONDYLOLISTHESIS AT L4-L5 LEVEL: ICD-10-CM

## 2020-01-01 DIAGNOSIS — Z79.01 CURRENT USE OF LONG TERM ANTICOAGULATION: ICD-10-CM

## 2020-01-01 DIAGNOSIS — M79.605 PAIN IN BOTH LOWER EXTREMITIES: ICD-10-CM

## 2020-01-01 DIAGNOSIS — R41.9 DEFICIT IN COMPREHENSION: ICD-10-CM

## 2020-01-01 DIAGNOSIS — F32.A ANXIETY AND DEPRESSION: ICD-10-CM

## 2020-01-01 DIAGNOSIS — Z87.81 HISTORY OF COMPRESSION FRACTURE OF SPINE: Primary | ICD-10-CM

## 2020-01-01 DIAGNOSIS — M79.606 PAIN OF LOWER EXTREMITY, UNSPECIFIED LATERALITY: ICD-10-CM

## 2020-01-01 DIAGNOSIS — M81.0 OSTEOPOROSIS, UNSPECIFIED OSTEOPOROSIS TYPE, UNSPECIFIED PATHOLOGICAL FRACTURE PRESENCE: ICD-10-CM

## 2020-01-01 DIAGNOSIS — F41.9 ANXIETY AND DEPRESSION: ICD-10-CM

## 2020-01-01 LAB
ALBUMIN SERPL-MCNC: 3.7 G/DL (ref 3.4–5)
ALBUMIN/GLOB SERPL: 1.4 {RATIO} (ref 0.8–1.7)
ALP SERPL-CCNC: 63 U/L (ref 45–117)
ALT SERPL-CCNC: 17 U/L (ref 13–56)
ANION GAP SERPL CALC-SCNC: 4 MMOL/L (ref 3–18)
AST SERPL-CCNC: 21 U/L (ref 10–38)
BASOPHILS # BLD: 0 K/UL (ref 0–0.1)
BASOPHILS NFR BLD: 1 % (ref 0–2)
BILIRUB SERPL-MCNC: 1 MG/DL (ref 0.2–1)
BUN SERPL-MCNC: 23 MG/DL (ref 7–18)
BUN/CREAT SERPL: 35 (ref 12–20)
CALCIUM SERPL-MCNC: 8.9 MG/DL (ref 8.5–10.1)
CHLORIDE SERPL-SCNC: 108 MMOL/L (ref 100–111)
CO2 SERPL-SCNC: 31 MMOL/L (ref 21–32)
CREAT SERPL-MCNC: 0.66 MG/DL (ref 0.6–1.3)
DIFFERENTIAL METHOD BLD: NORMAL
EOSINOPHIL # BLD: 0.3 K/UL (ref 0–0.4)
EOSINOPHIL NFR BLD: 5 % (ref 0–5)
ERYTHROCYTE [DISTWIDTH] IN BLOOD BY AUTOMATED COUNT: 13.6 % (ref 11.6–14.5)
GLOBULIN SER CALC-MCNC: 2.7 G/DL (ref 2–4)
GLUCOSE SERPL-MCNC: 77 MG/DL (ref 74–99)
HCT VFR BLD AUTO: 41.2 % (ref 35–45)
HGB BLD-MCNC: 13.4 G/DL (ref 12–16)
INR BLD: 1.1 (ref 0.9–1.2)
INR PPP: 2.6 (ref 0.8–1.2)
INR, EXTERNAL: 1.4
INR, EXTERNAL: 1.5
INR, EXTERNAL: 1.5
INR, EXTERNAL: 1.6
INR, EXTERNAL: 1.8
INR, EXTERNAL: 1.9
INR, EXTERNAL: 2.1
INR, EXTERNAL: 2.2
INR, EXTERNAL: 2.2
INR, EXTERNAL: 2.3
INR, EXTERNAL: 2.5
INR, EXTERNAL: 2.6
INR, EXTERNAL: 2.7
INR, EXTERNAL: 2.7
LYMPHOCYTES # BLD: 1.7 K/UL (ref 0.9–3.6)
LYMPHOCYTES NFR BLD: 35 % (ref 21–52)
MCH RBC QN AUTO: 30.4 PG (ref 24–34)
MCHC RBC AUTO-ENTMCNC: 32.5 G/DL (ref 31–37)
MCV RBC AUTO: 93.4 FL (ref 74–97)
MONOCYTES # BLD: 0.4 K/UL (ref 0.05–1.2)
MONOCYTES NFR BLD: 9 % (ref 3–10)
NEUTS SEG # BLD: 2.5 K/UL (ref 1.8–8)
NEUTS SEG NFR BLD: 50 % (ref 40–73)
PLATELET # BLD AUTO: 211 K/UL (ref 135–420)
PMV BLD AUTO: 11.6 FL (ref 9.2–11.8)
POTASSIUM SERPL-SCNC: 3.9 MMOL/L (ref 3.5–5.5)
PROT SERPL-MCNC: 6.4 G/DL (ref 6.4–8.2)
PROTHROMBIN TIME: 27.3 SEC (ref 11.5–15.2)
RBC # BLD AUTO: 4.41 M/UL (ref 4.2–5.3)
SODIUM SERPL-SCNC: 143 MMOL/L (ref 136–145)
WBC # BLD AUTO: 4.9 K/UL (ref 4.6–13.2)

## 2020-01-01 PROCEDURE — G8420 CALC BMI NORM PARAMETERS: HCPCS | Performed by: INTERNAL MEDICINE

## 2020-01-01 PROCEDURE — 3331090001 HH PPS REVENUE CREDIT

## 2020-01-01 PROCEDURE — 77030002933 HC SUT MCRYL J&J -A: Performed by: INTERNAL MEDICINE

## 2020-01-01 PROCEDURE — 85610 PROTHROMBIN TIME: CPT

## 2020-01-01 PROCEDURE — 77030019580 HC CBL PACE MEDT -B: Performed by: INTERNAL MEDICINE

## 2020-01-01 PROCEDURE — 3331090002 HH PPS REVENUE DEBIT

## 2020-01-01 PROCEDURE — 74011250636 HC RX REV CODE- 250/636: Performed by: INTERNAL MEDICINE

## 2020-01-01 PROCEDURE — 85025 COMPLETE CBC W/AUTO DIFF WBC: CPT

## 2020-01-01 PROCEDURE — G0463 HOSPITAL OUTPT CLINIC VISIT: HCPCS | Performed by: FAMILY MEDICINE

## 2020-01-01 PROCEDURE — G8510 SCR DEP NEG, NO PLAN REQD: HCPCS | Performed by: INTERNAL MEDICINE

## 2020-01-01 PROCEDURE — G0152 HHCP-SERV OF OT,EA 15 MIN: HCPCS

## 2020-01-01 PROCEDURE — G8427 DOCREV CUR MEDS BY ELIG CLIN: HCPCS | Performed by: FAMILY MEDICINE

## 2020-01-01 PROCEDURE — 400013 HH SOC

## 2020-01-01 PROCEDURE — 99152 MOD SED SAME PHYS/QHP 5/>YRS: CPT | Performed by: INTERNAL MEDICINE

## 2020-01-01 PROCEDURE — 93279 PRGRMG DEV EVAL PM/LDLS PM: CPT | Performed by: INTERNAL MEDICINE

## 2020-01-01 PROCEDURE — G8536 NO DOC ELDER MAL SCRN: HCPCS | Performed by: FAMILY MEDICINE

## 2020-01-01 PROCEDURE — 3288F FALL RISK ASSESSMENT DOCD: CPT | Performed by: FAMILY MEDICINE

## 2020-01-01 PROCEDURE — 33227 REMOVE&REPLACE PM GEN SINGL: CPT | Performed by: INTERNAL MEDICINE

## 2020-01-01 PROCEDURE — 71046 X-RAY EXAM CHEST 2 VIEWS: CPT

## 2020-01-01 PROCEDURE — 1090F PRES/ABSN URINE INCON ASSESS: CPT | Performed by: FAMILY MEDICINE

## 2020-01-01 PROCEDURE — 80053 COMPREHEN METABOLIC PANEL: CPT

## 2020-01-01 PROCEDURE — 99214 OFFICE O/P EST MOD 30 MIN: CPT | Performed by: FAMILY MEDICINE

## 2020-01-01 PROCEDURE — 90471 IMMUNIZATION ADMIN: CPT | Performed by: FAMILY MEDICINE

## 2020-01-01 PROCEDURE — 99215 OFFICE O/P EST HI 40 MIN: CPT | Performed by: INTERNAL MEDICINE

## 2020-01-01 PROCEDURE — G0158 HHC OT ASSISTANT EA 15: HCPCS

## 2020-01-01 PROCEDURE — 77030018729 HC ELECTRD DEFIB PAD CARD -B: Performed by: INTERNAL MEDICINE

## 2020-01-01 PROCEDURE — G8427 DOCREV CUR MEDS BY ELIG CLIN: HCPCS | Performed by: INTERNAL MEDICINE

## 2020-01-01 PROCEDURE — 33227 REMOVE&REPLACE PM GEN SINGL: CPT

## 2020-01-01 PROCEDURE — 36415 COLL VENOUS BLD VENIPUNCTURE: CPT

## 2020-01-01 PROCEDURE — 74011000250 HC RX REV CODE- 250: Performed by: INTERNAL MEDICINE

## 2020-01-01 PROCEDURE — 77030028698 HC BLD TISS PLSM MEDT -D: Performed by: INTERNAL MEDICINE

## 2020-01-01 PROCEDURE — 77030031139 HC SUT VCRL2 J&J -A: Performed by: INTERNAL MEDICINE

## 2020-01-01 PROCEDURE — 1100F PTFALLS ASSESS-DOCD GE2>/YR: CPT | Performed by: FAMILY MEDICINE

## 2020-01-01 PROCEDURE — 3331090003 HH PPS REVENUE ADJ

## 2020-01-01 PROCEDURE — 90653 IIV ADJUVANT VACCINE IM: CPT | Performed by: FAMILY MEDICINE

## 2020-01-01 PROCEDURE — G8510 SCR DEP NEG, NO PLAN REQD: HCPCS | Performed by: FAMILY MEDICINE

## 2020-01-01 PROCEDURE — 99153 MOD SED SAME PHYS/QHP EA: CPT | Performed by: INTERNAL MEDICINE

## 2020-01-01 PROCEDURE — G8536 NO DOC ELDER MAL SCRN: HCPCS | Performed by: INTERNAL MEDICINE

## 2020-01-01 PROCEDURE — 33228 REMV&REPLC PM GEN DUAL LEAD: CPT | Performed by: INTERNAL MEDICINE

## 2020-01-01 PROCEDURE — G8420 CALC BMI NORM PARAMETERS: HCPCS | Performed by: FAMILY MEDICINE

## 2020-01-01 PROCEDURE — C1786 PMKR, SINGLE, RATE-RESP: HCPCS | Performed by: INTERNAL MEDICINE

## 2020-01-01 DEVICE — IPG W1SR01 AZURE XT SR MRI WL USA
Type: IMPLANTABLE DEVICE | Status: FUNCTIONAL
Brand: AZURE™ XT SR MRI SURESCAN™

## 2020-01-01 RX ORDER — SODIUM CHLORIDE 0.9 % (FLUSH) 0.9 %
5-40 SYRINGE (ML) INJECTION EVERY 8 HOURS
Status: CANCELLED | OUTPATIENT
Start: 2020-01-01

## 2020-01-01 RX ORDER — CEFAZOLIN SODIUM 1 G/3ML
INJECTION, POWDER, FOR SOLUTION INTRAMUSCULAR; INTRAVENOUS AS NEEDED
Status: DISCONTINUED | OUTPATIENT
Start: 2020-01-01 | End: 2020-01-01 | Stop reason: HOSPADM

## 2020-01-01 RX ORDER — FENTANYL CITRATE 50 UG/ML
INJECTION, SOLUTION INTRAMUSCULAR; INTRAVENOUS AS NEEDED
Status: DISCONTINUED | OUTPATIENT
Start: 2020-01-01 | End: 2020-01-01 | Stop reason: HOSPADM

## 2020-01-01 RX ORDER — CEFAZOLIN SODIUM 2 G/50ML
2 SOLUTION INTRAVENOUS ONCE
Status: DISCONTINUED | OUTPATIENT
Start: 2020-01-01 | End: 2020-01-01 | Stop reason: SDUPTHER

## 2020-01-01 RX ORDER — WARFARIN SODIUM 5 MG/1
5 TABLET ORAL DAILY
Qty: 135 TAB | Refills: 3 | Status: SHIPPED | OUTPATIENT
Start: 2020-01-01 | End: 2020-01-01

## 2020-01-01 RX ORDER — SODIUM CHLORIDE 0.9 % (FLUSH) 0.9 %
5-40 SYRINGE (ML) INJECTION AS NEEDED
Status: DISCONTINUED | OUTPATIENT
Start: 2020-01-01 | End: 2020-01-01 | Stop reason: HOSPADM

## 2020-01-01 RX ORDER — GENTAMICIN SULFATE 40 MG/ML
INJECTION, SOLUTION INTRAMUSCULAR; INTRAVENOUS AS NEEDED
Status: DISCONTINUED | OUTPATIENT
Start: 2020-01-01 | End: 2020-01-01 | Stop reason: HOSPADM

## 2020-01-01 RX ORDER — LIDOCAINE HYDROCHLORIDE 10 MG/ML
INJECTION, SOLUTION EPIDURAL; INFILTRATION; INTRACAUDAL; PERINEURAL AS NEEDED
Status: DISCONTINUED | OUTPATIENT
Start: 2020-01-01 | End: 2020-01-01 | Stop reason: HOSPADM

## 2020-01-01 RX ORDER — TOLTERODINE 2 MG/1
CAPSULE, EXTENDED RELEASE ORAL
Qty: 30 CAP | Refills: 0 | Status: SHIPPED | OUTPATIENT
Start: 2020-01-01 | End: 2020-01-01 | Stop reason: SDUPTHER

## 2020-01-01 RX ORDER — CEFAZOLIN SODIUM 2 G/50ML
2 SOLUTION INTRAVENOUS ONCE
Status: DISCONTINUED | OUTPATIENT
Start: 2020-01-01 | End: 2020-01-01 | Stop reason: HOSPADM

## 2020-01-01 RX ORDER — DULOXETIN HYDROCHLORIDE 20 MG/1
20 CAPSULE, DELAYED RELEASE ORAL
Qty: 30 CAP | Refills: 1 | Status: SHIPPED | OUTPATIENT
Start: 2020-01-01 | End: 2020-01-01

## 2020-01-01 RX ORDER — TOLTERODINE 2 MG/1
CAPSULE, EXTENDED RELEASE ORAL
Qty: 30 CAP | Refills: 0 | Status: SHIPPED | OUTPATIENT
Start: 2020-01-01 | End: 2020-01-01

## 2020-01-01 RX ORDER — MIDAZOLAM HYDROCHLORIDE 1 MG/ML
INJECTION, SOLUTION INTRAMUSCULAR; INTRAVENOUS AS NEEDED
Status: DISCONTINUED | OUTPATIENT
Start: 2020-01-01 | End: 2020-01-01 | Stop reason: HOSPADM

## 2020-01-01 RX ORDER — WARFARIN SODIUM 5 MG/1
5 TABLET ORAL DAILY
Qty: 90 TAB | Refills: 3 | Status: SHIPPED | OUTPATIENT
Start: 2020-01-01

## 2020-01-01 RX ORDER — WARFARIN SODIUM 5 MG/1
5 TABLET ORAL DAILY
Qty: 30 TAB | Refills: 2 | Status: SHIPPED | OUTPATIENT
Start: 2020-01-01 | End: 2020-01-01 | Stop reason: SDUPTHER

## 2020-01-01 RX ORDER — SODIUM CHLORIDE 0.9 % (FLUSH) 0.9 %
5-40 SYRINGE (ML) INJECTION EVERY 8 HOURS
Status: DISCONTINUED | OUTPATIENT
Start: 2020-01-01 | End: 2020-01-01 | Stop reason: HOSPADM

## 2020-01-01 RX ORDER — TOLTERODINE 2 MG/1
CAPSULE, EXTENDED RELEASE ORAL
Qty: 90 CAP | Refills: 0 | Status: SHIPPED | OUTPATIENT
Start: 2020-01-01 | End: 2020-01-01 | Stop reason: SDUPTHER

## 2020-01-01 RX ORDER — WARFARIN 2.5 MG/1
5 TABLET ORAL DAILY
Qty: 90 TAB | Refills: 6 | Status: SHIPPED | OUTPATIENT
Start: 2020-01-01

## 2020-01-01 RX ORDER — CEFAZOLIN SODIUM 2 G/50ML
SOLUTION INTRAVENOUS
Status: DISCONTINUED
Start: 2020-01-01 | End: 2020-01-01 | Stop reason: HOSPADM

## 2020-01-01 RX ORDER — WARFARIN 2.5 MG/1
5 TABLET ORAL DAILY
Qty: 90 TAB | Refills: 6 | Status: SHIPPED | OUTPATIENT
Start: 2020-01-01 | End: 2020-01-01 | Stop reason: SDUPTHER

## 2020-01-01 RX ORDER — SODIUM CHLORIDE 0.9 % (FLUSH) 0.9 %
5-40 SYRINGE (ML) INJECTION AS NEEDED
Status: CANCELLED | OUTPATIENT
Start: 2020-01-01

## 2020-01-09 ENCOUNTER — OFFICE VISIT (OUTPATIENT)
Dept: SURGERY | Age: 85
End: 2020-01-09

## 2020-01-09 VITALS
DIASTOLIC BLOOD PRESSURE: 68 MMHG | RESPIRATION RATE: 18 BRPM | OXYGEN SATURATION: 97 % | HEIGHT: 59 IN | BODY MASS INDEX: 26 KG/M2 | WEIGHT: 129 LBS | HEART RATE: 80 BPM | SYSTOLIC BLOOD PRESSURE: 127 MMHG | TEMPERATURE: 98.9 F

## 2020-01-09 DIAGNOSIS — K56.41 FECAL IMPACTION (HCC): ICD-10-CM

## 2020-01-09 DIAGNOSIS — Z43.3 ATTENTION TO COLOSTOMY (HCC): Primary | ICD-10-CM

## 2020-01-09 RX ORDER — FACIAL-BODY WIPES
10 EACH TOPICAL DAILY
Qty: 1 SUPPOSITORY | Refills: 0 | Status: SHIPPED | OUTPATIENT
Start: 2020-01-09 | End: 2020-01-01

## 2020-01-09 NOTE — PROGRESS NOTES
HPI: Aruna Campos is a 80 y.o. female presenting with chief complain of attention to colostomy. The patient had a colostomy made in 2006 for colon perforation. It is unclear to her whether she had diverticulitis. The colostomy is functioning well. She empties her bag on a daily basis. She denies blood per rectum or blood per colostomy. It is unclear to her whether she has ever had a colonoscopy. She denies any recent unexplained weight loss. She is tolerating a diet without difficulty. Past Medical History:   Diagnosis Date    Bradycardia     Carotid artery stenosis     Chronic atrial fibrillation     [facility]re[facility] [address]     DDD (degenerative disc disease), cervical     Essential hypertension     Fibromyalgia     GERD (gastroesophageal reflux disease)     H/O burning pain in leg     right leg, current    Macular degeneration     right eye    Mitral valve disorder     Osteopenia     Spinal stenosis     TIA (transient ischemic attack)        Past Surgical History:   Procedure Laterality Date    HX CATARACT REMOVAL Bilateral     HX COLOSTOMY  2006    perfortated colon    HX GYN      HX HYSTERECTOMY      HX ORTHOPAEDIC      left ankle, ORIF, pins placed and taken out    HX PACEMAKER      cardiac pacemaker    HX PACEMAKER      HX ROTATOR CUFF REPAIR Right        Family History   Problem Relation Age of Onset    Stroke Mother     Heart Failure Father        Social History     Socioeconomic History    Marital status:      Spouse name: Not on file    Number of children: Not on file    Years of education: Not on file    Highest education level: Not on file   Tobacco Use    Smoking status: Never Smoker    Smokeless tobacco: Never Used   Substance and Sexual Activity    Alcohol use: Never     Frequency: Never    Drug use: Never    Sexual activity: Not Currently       Review of Systems - Review of Systems   Constitutional: Positive for malaise/fatigue. Negative for chills, diaphoresis, fever and weight loss. HENT: Positive for congestion and hearing loss. Negative for ear discharge, ear pain, nosebleeds, sinus pain, sore throat and tinnitus. Eyes: Negative. Respiratory: Positive for cough and wheezing. Negative for hemoptysis, sputum production, shortness of breath and stridor. Cardiovascular: Negative. Gastrointestinal: Negative. Genitourinary: Negative. Musculoskeletal: Positive for back pain, joint pain and neck pain. Negative for falls and myalgias. Skin: Positive for itching. Negative for rash. Neurological: Positive for weakness. Negative for dizziness, tingling, tremors, sensory change, speech change, focal weakness, seizures, loss of consciousness and headaches. Endo/Heme/Allergies: Negative for environmental allergies and polydipsia. Bruises/bleeds easily. Psychiatric/Behavioral: Positive for memory loss. Negative for depression, hallucinations, substance abuse and suicidal ideas. The patient has insomnia. The patient is not nervous/anxious. Outpatient Medications Marked as Taking for the 1/9/20 encounter (Office Visit) with Noemy Owens MD   Medication Sig Dispense Refill    warfarin (COUMADIN) 2.5 mg tablet Take 1 Tab by mouth daily. Take one tab Monday-Wednesday along with 5 mg . Total 7.5 mg Monday- Wednesday. . Other days she will be on 5 mg 48 Tab 0    warfarin (COUMADIN) 5 mg tablet Take 1 Tab by mouth daily. Take Monday-Wednesday 7.5 mg and rest of the week 5 mg. 90 Tab 2    diclofenac (VOLTAREN) 1 % gel       acetaminophen (TYLENOL EXTRA STRENGTH) 500 mg tablet Take  by mouth every six (6) hours as needed for Pain.  diphenhydramine HCl (BENADRYL PO) Take  by mouth.  multivitamin (ONE A DAY) tablet Take 1 Tab by mouth daily.       OTHER OTC vision supplement         Allergies   Allergen Reactions    Aspirin Other (comments)     On blood thinners    Citric Acid Other (comments)     Patient is not sure of reaction    Codeine Other (comments)    Gabapentin Other (comments)    Statins-Hmg-Coa Reductase Inhibitors Other (comments)       Vitals:    01/09/20 1449   Resp: 18   Weight: 58.5 kg (129 lb)   Height: 4' 11\" (1.499 m)   PainSc:   8   PainLoc: Leg       Physical Exam  Constitutional:       Appearance: She is well-developed. HENT:      Head: Normocephalic and atraumatic. Eyes:      Conjunctiva/sclera: Conjunctivae normal.   Abdominal:      General: There is no distension. Palpations: Abdomen is soft. Tenderness: There is no tenderness. Musculoskeletal: Normal range of motion. Lymphadenopathy:      Cervical: No cervical adenopathy. Skin:     General: Skin is warm and dry. Findings: No rash. Neurological:      Sensory: No sensory deficit. Psychiatric:         Speech: Speech normal.     Rectum: Normal tone, stool ball in the distal rectum, no mass    Assessment / Plan    Attention to colostomy  Currently no issues  She does have some stool in the rectal vault which I tell her she should take an enema or a suppository to evacuate  She can follow-up as necessary    A total of 30 minutes was spent with the patient, with > 50% of time spent in counseling and coordination of care. The diagnoses and plan were discussed with the patient. All questions answered. Plan of care agreed to by all concerned.

## 2020-01-13 ENCOUNTER — OFFICE VISIT (OUTPATIENT)
Dept: FAMILY MEDICINE CLINIC | Age: 85
End: 2020-01-13

## 2020-01-13 VITALS
TEMPERATURE: 97.8 F | HEART RATE: 77 BPM | SYSTOLIC BLOOD PRESSURE: 112 MMHG | DIASTOLIC BLOOD PRESSURE: 64 MMHG | BODY MASS INDEX: 26 KG/M2 | OXYGEN SATURATION: 96 % | HEIGHT: 59 IN | WEIGHT: 129 LBS | RESPIRATION RATE: 16 BRPM

## 2020-01-13 DIAGNOSIS — H35.30 MACULAR DEGENERATION OF RIGHT EYE, UNSPECIFIED TYPE: ICD-10-CM

## 2020-01-13 DIAGNOSIS — Z86.59 H/O: DEPRESSION: ICD-10-CM

## 2020-01-13 DIAGNOSIS — Z87.39 H/O FIBROMYALGIA: ICD-10-CM

## 2020-01-13 DIAGNOSIS — Z95.0 PACEMAKER: Primary | ICD-10-CM

## 2020-01-13 DIAGNOSIS — Z00.00 MEDICARE ANNUAL WELLNESS VISIT, SUBSEQUENT: ICD-10-CM

## 2020-01-13 DIAGNOSIS — R41.3 MEMORY CHANGES: ICD-10-CM

## 2020-01-13 DIAGNOSIS — Z78.9 IMPAIRED MOBILITY AND ADLS: ICD-10-CM

## 2020-01-13 DIAGNOSIS — M21.961 DEFORMITY OF RIGHT FOOT: ICD-10-CM

## 2020-01-13 DIAGNOSIS — H91.93 HEARING PROBLEM OF BOTH EARS: ICD-10-CM

## 2020-01-13 DIAGNOSIS — R76.8 ELEVATED ANTINUCLEAR ANTIBODY (ANA) LEVEL: ICD-10-CM

## 2020-01-13 DIAGNOSIS — Z74.09 IMPAIRED MOBILITY AND ADLS: ICD-10-CM

## 2020-01-13 DIAGNOSIS — I48.20 CHRONIC A-FIB (HCC): ICD-10-CM

## 2020-01-13 DIAGNOSIS — N39.41 URGE INCONTINENCE OF URINE: ICD-10-CM

## 2020-01-13 PROBLEM — Z87.81 H/O COMPRESSION FRACTURE OF SPINE: Status: ACTIVE | Noted: 2020-01-13

## 2020-01-13 PROBLEM — M51.36 LUMBAR DEGENERATIVE DISC DISEASE: Status: ACTIVE | Noted: 2020-01-13

## 2020-01-13 PROBLEM — M54.17 LUMBOSACRAL RADICULOPATHY: Status: ACTIVE | Noted: 2020-01-13

## 2020-01-13 PROBLEM — Z90.49 H/O TOTAL COLECTOMY: Status: ACTIVE | Noted: 2020-01-13

## 2020-01-13 PROBLEM — R52 PAIN OF MULTIPLE SITES: Status: ACTIVE | Noted: 2020-01-13

## 2020-01-13 PROBLEM — Z79.01 CURRENT USE OF LONG TERM ANTICOAGULATION: Status: ACTIVE | Noted: 2020-01-13

## 2020-01-13 PROBLEM — M79.604 PAIN OF RIGHT LOWER EXTREMITY: Status: ACTIVE | Noted: 2020-01-13

## 2020-01-13 PROBLEM — Z86.73 H/O: STROKE: Status: ACTIVE | Noted: 2020-01-13

## 2020-01-13 RX ORDER — TOLTERODINE 2 MG/1
2 CAPSULE, EXTENDED RELEASE ORAL DAILY
Qty: 30 CAP | Refills: 0 | Status: SHIPPED | OUTPATIENT
Start: 2020-01-13 | End: 2020-02-07

## 2020-01-13 NOTE — PROGRESS NOTES
Chief Complaint   Patient presents with    Follow Up Chronic Condition     multiple medical problems    Results     1. Have you been to the ER, urgent care clinic since your last visit? Hospitalized since your last visit? No    2. Have you seen or consulted any other health care providers outside of the [address] [place] [address] since your last visit? Include any pap smears or colon screening. Dr Bandar Odom referred to Retina specialist, Rheum appt Genie Garibay 2/5/20    This is the Subsequent Medicare Annual Wellness Exam, performed 12 months or more after the Initial AWV or the last Subsequent AWV    I have reviewed the patient's medical history in detail and updated the computerized patient record.      History     Patient Active Problem List   Diagnosis Code    Peripheral vascular disease ([facility] [address] I73.9    Urge incontinence of urine N39.41    Chronic a-fib I48.20    H/O: stroke Z80.78    Pacemaker Z95.0    Current use of long term anticoagulation Z79.01    Lumbar degenerative disc disease M51.36    Pain of multiple sites R52    Pain of right lower extremity M79.604    Deformity of right foot M21.961    Lumbosacral radiculopathy M54.17    H/O total colectomy Z90.49    Macular degeneration of right eye H35.30    H/O fibromyalgia Z87.39    H/O: depression Z80.58    Elevated antinuclear antibody (TERESITA) level R76.8    H/O compression fracture of spine Z87.81     Past Medical History:   Diagnosis Date    Bradycardia     Carotid artery stenosis     Chronic atrial fibrillation     CVA [facility]cerebral vascular accident) ([facility] [address]     DDD (degenerative disc disease), cervical     Essential hypertension     Fibromyalgia     GERD (gastroesophageal reflux disease)     H/O burning pain in leg     right leg, current    Macular degeneration     right eye    Mitral valve disorder     Osteopenia     Spinal stenosis     TIA (transient ischemic attack)       Past Surgical History: Procedure Laterality Date    HX CATARACT REMOVAL Bilateral     HX COLOSTOMY  2006    perfortated colon    HX GYN      HX HYSTERECTOMY      HX ORTHOPAEDIC      left ankle, ORIF, pins placed and taken out    HX PACEMAKER      cardiac pacemaker    HX PACEMAKER      HX ROTATOR CUFF REPAIR Right      Current Outpatient Medications   Medication Sig Dispense Refill    tolterodine ER (DETROL-LA) 2 mg ER capsule Take 1 Cap by mouth daily. 30 Cap 0    bisacodyl (DULCOLAX) 10 mg suppository Insert 10 mg into rectum daily. 1 Suppository 0    warfarin (COUMADIN) 5 mg tablet Take 1 Tab by mouth daily. Take Monday-Wednesday 7.5 mg and rest of the week 5 mg. 90 Tab 2    diclofenac (VOLTAREN) 1 % gel       fluticasone propionate (FLONASE) 50 mcg/actuation nasal spray       acetaminophen (TYLENOL EXTRA STRENGTH) 500 mg tablet Take  by mouth every six (6) hours as needed for Pain.  multivitamin (ONE A DAY) tablet Take 1 Tab by mouth daily.  OTHER OTC vision supplement      warfarin (COUMADIN) 2.5 mg tablet Take 1 Tab by mouth daily. Take one tab Monday-Wednesday along with 5 mg . Total 7.5 mg Monday- Wednesday. . Other days she will be on 5 mg 48 Tab 0    diphenhydramine HCl (BENADRYL PO) Take  by mouth.  cholecalciferol, vitamin D3, (VITAMIN D3 PO) Take  by mouth.        Allergies   Allergen Reactions    Statins-Hmg-Coa Reductase Inhibitors Other (comments)     \"Intolerance- Medium reaction\"    Aspirin Other (comments)     On blood thinners    Citric Acid Other (comments)     Patient is not sure of reaction    Codeine Other (comments)    Gabapentin Other (comments)       Family History   Problem Relation Age of Onset    Stroke Mother     Heart Failure Father      Social History     Tobacco Use    Smoking status: Never Smoker    Smokeless tobacco: Never Used   Substance Use Topics    Alcohol use: Never     Frequency: Never       Depression Risk Factor Screening:     3 most recent PHQ Screens 1/13/2020   Little interest or pleasure in doing things Not at all   Feeling down, depressed, irritable, or hopeless Not at all   Total Score PHQ 2 0       Alcohol Risk Factor Screening:   Do you average 1 drink per night or more than 7 drinks a week:  No    On any one occasion in the past three months have you have had more than 3 drinks containing alcohol:  No      Functional Ability and Level of Safety:   Hearing: The patient needs further evaluation. ENT referall today    Activities of Daily Living: The home contains: handrails, grab bars and walker, canes, raised toilet, shower chair  Patient needs help with:  transportation, preparing meals, laundry, housework, managing medications, managing money, bathing, hygiene, bathroom needs and walking    Ambulation: with difficulty, uses a cane    Fall Risk:  Fall Risk Assessment, last 12 mths 1/13/2020   Able to walk? Yes   Fall in past 12 months? Yes   Fall with injury? Yes   Number of falls in past 12 months 8 or more   Fall Risk Score 9       Abuse Screen:  Patient is not abused    Cognitive Screening   Has your family/caregiver stated any concerns about your memory: no  Cognitive Screening:      Patient Care Team   Patient Care Team:  Moni Lai MD as PCP - General (Family Practice)  Moni Lai MD as PCP - [facility] Empaneled Provider    Assessment/Plan   Education and counseling provided:  Are appropriate based on today's review and evaluation  Review nurses note and assessment. Agree with that. Discussed 5 years health plan and given copy in AVS.  Discussed advance directive. Given hand out with AVS for more information. Discussed DNR and DNI. See ACP note from today. Diagnoses and all orders for this visit:    1. Pacemaker  Comments:  a.fib. following cardiology     2. Deformity of right foot  Comments:  seen podiatry     3. Macular degeneration of right eye, unspecified type  Comments:  seen opthalmology. referred for cataract surgery     4. Urge incontinence of urine  -     tolterodine ER (DETROL-LA) 2 mg ER capsule; Take 1 Cap by mouth daily.  -     REFERRAL TO UROLOGY    5. Chronic a-fib    6. H/O fibromyalgia  Comments:  pending appt with rheumatology     7. Elevated antinuclear antibody (TERESITA) level    8. H/O: depression  Comments:  recently lost her . see prior note. now living with her son and daughter in law. now coping with it and happy. Orders:  -     REFERRAL TO NEUROPSYCHOLOGY    9. Memory changes  -     REFERRAL TO NEUROPSYCHOLOGY    10. Hearing problem of both ears  Comments:  ? cerumen impaction.  sending to ENT   Orders:  -     REFERRAL TO ENT-OTOLARYNGOLOGY    11. Impaired mobility and ADLs  Comments:  from lower back pain and lumbar radiculopathy   Orders:  -     [address] Maintenance Due   Topic Date Due    DTaP/Tdap/Td series (1 - Tdap) 10/04/1945    Shingrix Vaccine Age 50> (1 of 2) 10/04/1984    GLAUCOMA SCREENING Q2Y  10/04/1999    Bone Densitometry (Dexa) Screening  10/04/1999    MEDICARE YEARLY EXAM  11/18/2019

## 2020-01-13 NOTE — PATIENT INSTRUCTIONS
Stress Incontinence in Women: Care Instructions  Your Care Instructions  Stress incontinence is the accidental release of urine caused by activities that put pressure on your bladder. It may happen most often when you sneeze, cough, laugh, jog, or lift something heavy. This condition does not cause major health problems, but it can be embarrassing and interfere with your life. Treatment can cure or improve your symptoms. Follow-up care is a key part of your treatment and safety. Be sure to make and go to all appointments, and call your doctor if you are having problems. It's also a good idea to know your test results and keep a list of the medicines you take. How can you care for yourself at home? · Take your medicines exactly as prescribed. Call your doctor if you think you are having a problem with your medicine. · Limit caffeine and alcohol. They make you urinate more. · Do pelvic floor (Kegel) exercises, which tighten and strengthen pelvic muscles. To do Kegel exercises:  ? Squeeze the same muscles you would use to stop your urine. Your belly and thighs should not move. ? Hold the squeeze for 3 seconds, and then relax for 3 seconds. ? Start with 3 seconds. Then add 1 second each week until you are able to squeeze for 10 seconds. ? Repeat the exercise 10 to 15 times a session. Do three or more sessions a day. · Try wearing pads that absorb leaks. Or you may want to try to prevent leaks with a product like Poise Impressa, which you insert like a tampon. · Keep skin in the genital area dry. Petroleum jelly (like Vaseline) spread on the area may help protect your skin. When should you call for help? Call your doctor now or seek immediate medical care if:    · You have new urinary symptoms.  These may include leaking urine, having pain when urinating, or feeling like you need to urinate often.    Watch closely for changes in your health, and be sure to contact your doctor if:    · You do not get better as expected. Where can you learn more? Go to http://pedro-abiodun.info/. Enter I307 in the search box to learn more about \"Stress Incontinence in Women: Care Instructions. \"  Current as of: February 19, 2019  Content Version: 12.2  © 2863-8415 [employer]. Care instructions adapted under license by [employer] (which disclaims liability or warranty for this information). If you have questions about a medical condition or this instruction, always ask your healthcare professional. [place] [address] any warranty or liability for your use of this information. Advance Directives: Care Instructions  Your Care Instructions  An advance directive is a legal way to state your wishes at the end of your life. It tells your family and your doctor what to do if you can no longer say what you want. There are two main types of advance directives. You can change them any time that your wishes change. · A living will tells your family and your doctor your wishes about life support and other treatment. · A durable power of  for health care lets you name a person to make treatment decisions for you when you can't speak for yourself. This person is called a health care agent. If you do not have an advance directive, decisions about your medical care may be made by a doctor or a  who doesn't know you. It may help to think of an advance directive as a gift to the people who care for you. If you have one, they won't have to make tough decisions by themselves. Follow-up care is a key part of your treatment and safety. Be sure to make and go to all appointments, and call your doctor if you are having problems. It's also a good idea to know your test results and keep a list of the medicines you take. How can you care for yourself at home? · Discuss your wishes with your loved ones and your doctor. This way, there are no surprises.   · Many states have a unique form. Or you might use a universal form that has been approved by many states. This kind of form can sometimes be completed and stored online. Your electronic copy will then be available wherever you have a connection to the Internet. In most cases, doctors will respect your wishes even if you have a form from a different state. · You don't need a  to do an advance directive. But you may want to get legal advice. · Think about these questions when you prepare an advance directive:  ? Who do you want to make decisions about your medical care if you are not able to? Many people choose a family member or close friend. ? Do you know enough about life support methods that might be used? If not, talk to your doctor so you understand. ? What are you most afraid of that might happen? You might be afraid of having pain, losing your independence, or being kept alive by machines. ? Where would you prefer to die? Choices include your home, a hospital, or a nursing home. ? Would you like to have information about hospice care to support you and your family? ? Do you want to donate organs when you die? ? Do you want certain [employer] practices performed before you die? If so, put your wishes in the advance directive. · Read your advance directive every year, and make changes as needed. When should you call for help? Be sure to contact your doctor if you have any questions. Where can you learn more? Go to http://pedro-abiodun.info/. Enter R264 in the search box to learn more about \"Advance Directives: Care Instructions. \"  Current as of: April 1, 2019  Content Version: 12.2  © 8623-9503 [employer]. Care instructions adapted under license by [employer] (which disclaims liability or warranty for this information).  If you have questions about a medical condition or this instruction, always ask your healthcare professional. [address] any warranty or liability for your use of this information. Deciding About Being on a Ventilator When You Have a Terminal Illness  How can you decide about being on a ventilator when you have a terminal illness?   Your Care Instructions   A ventilator is a life-support machine that helps you breathe if you can no longer breathe on your own. The machine provides oxygen to your lungs through a tube. The tube enters your mouth and goes down your throat to your lungs. Most people on ventilators have to be fed through another tube that goes into the stomach.   You may feel that being on a ventilator would prevent a \"natural\" death or would keep you alive longer than necessary. Or you may feel that being on a ventilator would extend your life so you can do certain things, such as saying good-bye to loved ones.   The decision about whether to be on a ventilator is a personal one. Be sure to talk it over with your doctor and loved ones.   Follow-up care is a key part of your treatment and safety. Be sure to make and go to all appointments, and call your doctor if you are having problems. It's also a good idea to know your test results and keep a list of the medicines you take.  Cheryle Govern might you want to be on a ventilator?    · You think you may be able to return to your normal activities.     · You need help breathing because of a short illness or a problem that is not related to your terminal illness.     · You would like more time to say good-bye.     · You feel that there are more benefits than risks.   Cheryle Govern might you not want to be on a ventilator?    · You have other long-term health problems.     · You may not be able to return to your normal activities.     · You want a calm, peaceful death. You do not want to spend the rest of your life on a ventilator.     · You feel that there are more risks than benefits.    When should you call for help?   Be sure to contact your doctor if:    · You want to learn more about being on a ventilator.     · You change your mind about being on a ventilator. Where can you learn more? Go to http://pedro-abiodun.info/. Enter C662 in the search box to learn more about \"Deciding About Being on a Ventilator When You Have a Terminal Illness. \"  Current as of: April 1, 2019  Content Version: 12.2  © 5723-8504 [employer]. Care instructions adapted under license by [employer] (which disclaims liability or warranty for this information). If you have questions about a medical condition or this instruction, always ask your healthcare professional. [address] any warranty or liability for your use of this information. Learning About Living Marissa Kane  What is a living will? A living will is a legal form you use to write down the kind of care you want at the end of your life. It is used by the health professionals who will treat you if you aren't able to decide for yourself. If you put your wishes in writing, your loved ones and others will know what kind of care you want. They won't need to guess. This can ease your mind and be helpful to others. A living will is not the same as an estate or property will. An estate will explains what you want to happen with your money and property after you die. Is a living will a legal document? A living will is a legal document. Each state has its own laws about living nino. If you move to another state, make sure that your living will is legal in the state where you now live. Or you might use a universal form that has been approved by many states. This kind of form can sometimes be completed and stored online. Your electronic copy will then be available wherever you have a connection to the Internet. In most cases, doctors will respect your wishes even if you have a form from a different state. · You don't need an  to complete a living will.  But legal advice can be helpful if your state's laws are unclear, your health history is complicated, or your family can't agree on what should be in your living will. · You can change your living will at any time. Some people find that their wishes about end-of-life care change as their health changes. · In addition to making a living will, think about completing a medical power of  form. This form lets you name the person you want to make end-of-life treatment decisions for you (your \"health care agent\") if you're not able to. Many hospitals and nursing homes will give you the forms you need to complete a living will and a medical power of . · Your living will is used only if you can't make or communicate decisions for yourself anymore. If you become able to make decisions again, you can accept or refuse any treatment, no matter what you wrote in your living will. · Your state may offer an online registry. This is a place where you can store your living will online so the doctors and nurses who need to treat you can find it right away. What should you think about when creating a living will? Talk about your end-of-life wishes with your family members and your doctor. Let them know what you want. That way the people making decisions for you won't be surprised by your choices. Think about these questions as you make your living will:  · Do you know enough about life support methods that might be used? If not, talk to your doctor so you know what might be done if you can't breathe on your own, your heart stops, or you're unable to swallow. · What things would you still want to be able to do after you receive life-support methods? Would you want to be able to walk? To speak? To eat on your own? To live without the help of machines? · If you have a choice, where do you want to be cared for? In your home? At a hospital or nursing home? · Do you want certain [employer] practices performed if you become very ill?   · If you have a choice at the end of your life, where would you prefer to die? At home? In a hospital or nursing home? Somewhere else? · Would you prefer to be buried or cremated? · Do you want your organs to be donated after you die? What should you do with your living will? · Make sure that your family members and your health care agent have copies of your living will. · Give your doctor a copy of your living will to keep in your medical record. If you have more than one doctor, make sure that each one has a copy. · You may want to put a copy of your living will where it can be easily found. Where can you learn more? Go to http://pedro-abiodun.info/. Enter A555 in the search box to learn more about \"Learning About Living Florence Peñaloza. \"  Current as of: April 1, 2019  Content Version: 12.2  © 3685-9135 [employer]. Care instructions adapted under license by [employer] (which disclaims liability or warranty for this information). If you have questions about a medical condition or this instruction, always ask your healthcare professional. [address] any warranty or liability for your use of this information. Learning About Durable Power of  for Health Care  What is a durable power of  for health care? A durable power of  for health care is one type of the legal forms called advance directives. It lets you decide who you want to make treatment decisions for you if you cannot speak or decide for yourself. The person you choose is called your health care agent. Another type of advance directive is a living will. It lets you write down what kinds of treatment or life support you want or do not want. What should you think about when choosing a health care agent? Choose your health care agent carefully. This person may or may not be a family member. Talk to the person before you make your final decision.  Make sure he or she is comfortable with this responsibility. It's a good idea to choose someone who:  · Is at least 25years old. · Knows you well and understands what makes life meaningful for you. · Understands your Restoration and moral values. · Will do what you want, not what he or she wants. · Will be able to make difficult choices at a stressful time. · Will be able to refuse or stop treatment, if that is what you would want, even if you could die. · Will be firm and confident with health professionals if needed. · Will ask questions to get necessary information. · Lives near you or agrees to travel to you if needed. Your family may help you make medical decisions while you can still be part of that process. But it is important to choose one person to be your health care agent in case you are not able to make decisions for yourself. If you don't fill out the legal form and name a health care agent, the decisions your family can make may be limited. Who will make decisions for you if you do not have a health care agent? If you don't have a health care agent or a living will, your family members may disagree about your medical care. And then some medical professionals who may not know you as well might have to make decisions for you. In some cases, a  makes the decisions. When you name a health care agent, it is very clear who has the power to make health decisions for you. How do you name a health care agent? You name your health care agent on a legal form. It is usually called a durable power of  for health care. Ask your hospital, state bar association, or office on aging where to find these forms. You must sign the form to make it legal. Some states require you to get the form notarized. This means that a person called a  watches you sign the form and then he or she signs the form. Some states also require that two or more witnesses sign the form.   Be sure to tell your family members and doctors who your health care agent is. Keep your forms in a safe place. But make sure that your loved ones know where the forms are. This could be in your desk where you keep other important papers. Make sure your doctor has a copy of your forms. Where can you learn more? Go to http://pedro-abiodun.info/. Enter [id number] in the search box to learn more about \"Learning About Durable Power of  for [facility]. \"  Current as of: April 1, 2019  Content Version: 12.2  © 3686-3170 [employer]. Care instructions adapted under license by [employer] (which disclaims liability or warranty for this information). If you have questions about a medical condition or this instruction, always ask your healthcare professional. [address] any warranty or liability for your use of this information. Atrial Fibrillation: Care Instructions  Your Care Instructions    Atrial fibrillation is an irregular and often fast heartbeat. Treating this condition is important for several reasons. It can cause blood clots, which can travel from your heart to your brain and cause a stroke. If you have a fast heartbeat, you may feel lightheaded, dizzy, and weak. An irregular heartbeat can also increase your risk for heart failure. Atrial fibrillation is often the result of another heart condition, such as high blood pressure or coronary artery disease. Making changes to improve your heart condition will help you stay healthy and active. Follow-up care is a key part of your treatment and safety. Be sure to make and go to all appointments, and call your doctor if you are having problems. It's also a good idea to know your test results and keep a list of the medicines you take. How can you care for yourself at home? Medicines    · Take your medicines exactly as prescribed. Call your doctor if you think you are having a problem with your medicine.  You will get more details on the specific medicines your doctor prescribes.     · If your doctor has given you a blood thinner to prevent a stroke, be sure you get instructions about how to take your medicine safely. Blood thinners can cause serious bleeding problems.     · Do not take any vitamins, over-the-counter drugs, or herbal products without talking to your doctor first.    Lifestyle changes    · Do not smoke. Smoking can increase your chance of a stroke and heart attack. If you need help quitting, talk to your doctor about stop-smoking programs and medicines. These can increase your chances of quitting for good.     · Eat a heart-healthy diet.     · Stay at a healthy weight. Lose weight if you need to.     · Limit alcohol to 2 drinks a day for men and 1 drink a day for women. Too much alcohol can cause health problems.     · Avoid colds and flu. Get a pneumococcal vaccine shot. If you have had one before, ask your doctor whether you need another dose. Get a flu shot every year. If you must be around people with colds or flu, wash your hands often. Activity    · If your doctor recommends it, get more exercise. Walking is a good choice. Bit by bit, increase the amount you walk every day. Try for at least 30 minutes on most days of the week. You also may want to swim, bike, or do other activities. Your doctor may suggest that you join a cardiac rehabilitation program so that you can have help increasing your physical activity safely.     · Start light exercise if your doctor says it is okay. Even a small amount will help you get stronger, have more energy, and manage stress. Walking is an easy way to get exercise. Start out by walking a little more than you did in the hospital. Gradually increase the amount you walk.     · When you exercise, watch for signs that your heart is working too hard. You are pushing too hard if you cannot talk while you are exercising.  If you become short of breath or dizzy or have chest pain, sit down and rest immediately.     · Check your pulse regularly. Place two fingers on the artery at the palm side of your wrist, in line with your thumb. If your heartbeat seems uneven or fast, talk to your doctor. When should you call for help? Call 911 anytime you think you may need emergency care. For example, call if:    · You have symptoms of a heart attack. These may include:  ? Chest pain or pressure, or a strange feeling in the chest.  ? Sweating. ? Shortness of breath. ? Nausea or vomiting. ? Pain, pressure, or a strange feeling in the back, neck, jaw, or upper belly or in one or both shoulders or arms. ? Lightheadedness or sudden weakness. ? A fast or irregular heartbeat. After you call 911, the  may tell you to chew 1 adult-strength or 2 to 4 low-dose aspirin. Wait for an ambulance. Do not try to drive yourself.     · You have symptoms of a stroke. These may include:  ? Sudden numbness, tingling, weakness, or loss of movement in your face, arm, or leg, especially on only one side of your body. ? Sudden vision changes. ? Sudden trouble speaking. ? Sudden confusion or trouble understanding simple statements. ? Sudden problems with walking or balance. ? A sudden, severe headache that is different from past headaches.     · You passed out (lost consciousness).    Call your doctor now or seek immediate medical care if:    · You have new or increased shortness of breath.     · You feel dizzy or lightheaded, or you feel like you may faint.     · Your heart rate becomes irregular.     · You can feel your heart flutter in your chest or skip heartbeats. Tell your doctor if these symptoms are new or worse.    Watch closely for changes in your health, and be sure to contact your doctor if you have any problems. Where can you learn more? Go to http://pedro-abiodun.info/. Enter U020 in the search box to learn more about \"Atrial Fibrillation: Care Instructions. \"  Current as of: April 9, 2019  Content Version: 12.2  © 1248-1858 [employer]. Care instructions adapted under license by [employer] (which disclaims liability or warranty for this information). If you have questions about a medical condition or this instruction, always ask your healthcare professional. [address] any warranty or liability for your use of this information. Medicare Wellness Visit, Female     The best way to live healthy is to have a lifestyle where you eat a well-balanced diet, exercise regularly, limit alcohol use, and quit all forms of tobacco/nicotine, if applicable. Regular preventive services are another way to keep healthy. Preventive services (vaccines, screening tests, monitoring & exams) can help personalize your care plan, which helps you manage your own care. Screening tests can find health problems at the earliest stages, when they are easiest to treat. [facility] follows the current, evidence-based guidelines published by the [facility]io Rey (USPSTF) when recommending preventive services for our patients. Because we follow these guidelines, sometimes recommendations change over time as research supports it. (For example, mammograms used to be recommended annually. Even though Medicare will still pay for an annual mammogram, the newer guidelines recommend a mammogram every two years for women of average risk). Of course, you and your doctor may decide to screen more often for some diseases, based on your risk and your co-morbidities (chronic disease you are already diagnosed with). Preventive services for you include:  - Medicare offers their members a free annual wellness visit, which is time for you and your primary care provider to discuss and plan for your preventive service needs.  Take advantage of this benefit every year!  -All adults over the age of 72 should receive the recommended pneumonia vaccines. Current USPSTF guidelines recommend a series of two vaccines for the best pneumonia protection.   -All adults should have a flu vaccine yearly and a tetanus vaccine every 10 years.   -All adults age 48 and older should receive the shingles vaccines (series of two vaccines). -All adults age 38-68 who are overweight should have a diabetes screening test once every three years.   -All adults born between [birth year] and 1965 should be screened once for Hepatitis C.  -Other screening tests and preventive services for persons with diabetes include: an eye exam to screen for diabetic retinopathy, a kidney function test, a foot exam, and stricter control over your cholesterol.   -Cardiovascular screening for adults with routine risk involves an electrocardiogram (ECG) at intervals determined by your doctor.   -Colorectal cancer screenings should be done for adults age 54-65 with no increased risk factors for colorectal cancer. There are a number of acceptable methods of screening for this type of cancer. Each test has its own benefits and drawbacks. Discuss with your doctor what is most appropriate for you during your annual wellness visit. The different tests include: colonoscopy (considered the best screening method), a fecal occult blood test, a fecal DNA test, and sigmoidoscopy.    -A bone mass density test is recommended when a woman turns 65 to screen for osteoporosis. This test is only recommended one time, as a screening. Some providers will use this same test as a disease monitoring tool if you already have osteoporosis. -Breast cancer screenings are recommended every other year for women of normal risk, age 54-69.  -Cervical cancer screenings for women over age 72 are only recommended with certain risk factors.      Here is a list of your current Health Maintenance items (your personalized list of preventive services) with a due date:  Health Maintenance Due   Topic Date Due    DTaP/Tdap/Td  (1 - Tdap) 10/04/1945    Shingles Vaccine (1 of 2) 10/04/1984    Glaucoma Screening   10/04/1999    Bone Mineral Density   10/04/1999    Annual Well Visit  11/18/2019         Medicare Wellness Visit, Female     The best way to live healthy is to have a lifestyle where you eat a well-balanced diet, exercise regularly, limit alcohol use, and quit all forms of tobacco/nicotine, if applicable. Regular preventive services are another way to keep healthy. Preventive services (vaccines, screening tests, monitoring & exams) can help personalize your care plan, which helps you manage your own care. Screening tests can find health problems at the earliest stages, when they are easiest to treat. [facility] follows the current, evidence-based guidelines published by the [facility] ([facility]) when recommending preventive services for our patients. Because we follow these guidelines, sometimes recommendations change over time as research supports it. (For example, mammograms used to be recommended annually. Even though Medicare will still pay for an annual mammogram, the newer guidelines recommend a mammogram every two years for women of average risk). Of course, you and your doctor may decide to screen more often for some diseases, based on your risk and your co-morbidities (chronic disease you are already diagnosed with). Preventive services for you include:  - Medicare offers their members a free annual wellness visit, which is time for you and your primary care provider to discuss and plan for your preventive service needs. Take advantage of this benefit every year!  -All adults over the age of 72 should receive the recommended pneumonia vaccines.  Current USPSTF guidelines recommend a series of two vaccines for the best pneumonia protection.   -All adults should have a flu vaccine yearly and a tetanus vaccine every 10 years.   -All adults age 48 and older should receive the shingles vaccines (series of two vaccines). -All adults age 38-68 who are overweight should have a diabetes screening test once every three years.   -All adults born between [birth year] and 1965 should be screened once for Hepatitis C.  -Other screening tests and preventive services for persons with diabetes include: an eye exam to screen for diabetic retinopathy, a kidney function test, a foot exam, and stricter control over your cholesterol.   -Cardiovascular screening for adults with routine risk involves an electrocardiogram (ECG) at intervals determined by your doctor.   -Colorectal cancer screenings should be done for adults age 54-65 with no increased risk factors for colorectal cancer. There are a number of acceptable methods of screening for this type of cancer. Each test has its own benefits and drawbacks. Discuss with your doctor what is most appropriate for you during your annual wellness visit. The different tests include: colonoscopy (considered the best screening method), a fecal occult blood test, a fecal DNA test, and sigmoidoscopy.    -A bone mass density test is recommended when a woman turns 65 to screen for osteoporosis. This test is only recommended one time, as a screening. Some providers will use this same test as a disease monitoring tool if you already have osteoporosis. -Breast cancer screenings are recommended every other year for women of normal risk, age 54-69.  -Cervical cancer screenings for women over age 72 are only recommended with certain risk factors.      Here is a list of your current Health Maintenance items (your personalized list of preventive services) with a due date:  Health Maintenance Due   Topic Date Due    DTaP/Tdap/Td  (1 - Tdap) 10/04/1945    Shingles Vaccine (1 of 2) 10/04/1984    Glaucoma Screening   10/04/1999    Bone Mineral Density   10/04/1999    Annual Well Visit  11/18/2019

## 2020-01-13 NOTE — ACP (ADVANCE CARE PLANNING)
Advance Care Planning (ACP) Provider Conversation Snapshot    Date of ACP Conversation: 01/13/20  Persons included in Conversation:  patient  Length of ACP Conversation in minutes:  <16 minutes (Non-Billable)    Authorized Decision Maker (if patient is incapable of making informed decisions):    This person is:   mikael zhou , two sons             For Patients with Decision Making Capacity:   discussed advance directive. given hand out on it. she will think about it     Conversation Outcomes / Follow-Up Plan:   Recommended completion of Advance Directive form after review of ACP materials and conversation with prospective healthcare agent

## 2020-01-13 NOTE — PROGRESS NOTES
HISTORY OF PRESENT ILLNESS  Иван Rosales is a 80 y.o. female. HPI:recently established care. Multiple medical problems. Recently moved with her son and daughter in law after lost her . No grief. Sitting comfortable. No concern from family. Said happy as a young person. Sleep is fair. No mood changes. No appetite or weight changes. Sitting comfortable. Seen podiatry for her rt foot deformity and nail trimming. Also now pending appt with rheumatology as she has elevated TERESITA in the past. Multiple site pain, fibromyalgia, chronic fatigue. Will follow their recommendations and discussed with daughter in law to talk to the specialist regarding need for bone density test.   She aged out for mammogram, pap smear and colonoscopy. H/o a.fib. following cardiology. Coumadin managed by them. She has pacemaker and been checked by cardiology. Now also set up with home health PT/ INR by cardiology. Has seen ophthalmology for macular degeneration and she was referred to eye surgeon. Advised to follow specialist recommendations. Urge incontinence. Said she has to use brief and it is bad. No dysuria. UA last visit no signs of infection. She is ready to start medication. Discussed medication side effects and also done urology referral per her request.  Said forgetful. No behavior changes. She agrees to go for neuropsychology evaluation. Needs help with ADL. Bath, dressing up, fix her food. Using dillon for walking , ambulation. Has chronic lumbar radiculopathy. No weakness in lower ext but unsteady gait. Asking for home health evaluation for pt/ot and have done it today. Having hearing trouble. Dry cerumen very deep. No dizziness, no balance trouble. No ringing ears. Will send her to ENT for further evaluation and treatment.    Visit Vitals  /64 (BP 1 Location: Left arm, BP Patient Position: Sitting)   Pulse 77   Temp 97.8 °F (36.6 °C) (Oral)   Resp 16   Ht 4' 11\" (1.499 m)   Wt 129 lb (58.5 kg)   SpO2 96%   BMI 26.05 kg/m²     Review medication list, vitals, problem list,allergies. Review labs from care everywhere which was from January 2019. Stable cbc, cmp showed mild renal insufficiency cr 1.3. tsh wnl. ROS: no headache, dizziness, no chest pain or sob. No palpitation or diaphoresis. No nausea or vomiting. No abdominal pain. No bowel complains. Urine incontinance. No lower ext weakness. No appetite or weight changes. Sleep is fair. Physical Exam  Neck:      Comments: No palpable enlarge thyroid gland. Cardiovascular:      Pulses: Normal pulses. Heart sounds: Normal heart sounds. Pulmonary:      Effort: No respiratory distress. Breath sounds: No wheezing. Abdominal:      Palpations: Abdomen is soft. Tenderness: There is no tenderness. Musculoskeletal:         General: No swelling. Lymphadenopathy:      Cervical: No cervical adenopathy. Neurological:      General: No focal deficit present. Mental Status: She is alert. Psychiatric:         Behavior: Behavior normal.         ASSESSMENT and PLAN    ICD-10-CM ICD-9-CM    1. Pacemaker: now also coumadin monitored by them. Z95.0 V45.01     a.fib. following cardiology    2. Deformity of right foot M21.961 736.70     seen podiatry    3. Macular degeneration of right eye, unspecified type H35.30 362.50     seen opthalmology. referred for cataract surgery    4. Urge incontinence of urine: sending to urology. Also starting symptomatic treatment. Discussed medication side effects. N39.41 788. 31 tolterodine ER (DETROL-LA) 2 mg ER capsule      REFERRAL TO UROLOGY   5. Chronic a-fib: managed by cardiology. Pacemaker. I48.20 427.31    6. H/O fibromyalgia Z87.39 V13.59     pending appt with rheumatology    7. Elevated antinuclear antibody (TERESITA) level: pending appt with rheumatology  R76.8 795.79    8. H/O: depression:> stable. Z86.59 V11.8 REFERRAL TO NEUROPSYCHOLOGY    recently lost her . see prior note. now living with her son and daughter in law. now coping with it and happy. 9. Memory changes: sending for further evaluation . R41.3 780.93 REFERRAL TO NEUROPSYCHOLOGY   10. Hearing problem of both ears: sending to ENT>  H91.93 V41.2 REFERRAL TO ENT-OTOLARYNGOLOGY    ? cerumen impaction. sending to ENT    11. Impaired mobility and ADLs: PT/ OT anya.  Z74.09 799.89 REFERRAL TO HOME HEALTH    from lower back pain and lumbar radiculopathy    Pt understood and agree with the plan   Review HM   She will speak with her pharmacist regarding Tdap and shingrix. If it is affordable she will take them. Follow-up and Dispositions    · Return in about 1 month (around 2/13/2020).

## 2020-01-14 ENCOUNTER — HOME HEALTH ADMISSION (OUTPATIENT)
Dept: HOME HEALTH SERVICES | Facility: HOME HEALTH | Age: 85
End: 2020-01-14
Payer: MEDICARE

## 2020-01-15 ENCOUNTER — CLINICAL SUPPORT (OUTPATIENT)
Dept: CARDIOLOGY CLINIC | Age: 85
End: 2020-01-15

## 2020-01-15 ENCOUNTER — HOME CARE VISIT (OUTPATIENT)
Dept: SCHEDULING | Facility: HOME HEALTH | Age: 85
End: 2020-01-15
Payer: MEDICARE

## 2020-01-15 DIAGNOSIS — I48.91 ATRIAL FIBRILLATION, UNSPECIFIED TYPE (HCC): ICD-10-CM

## 2020-01-15 DIAGNOSIS — Z95.0 PACEMAKER: Primary | ICD-10-CM

## 2020-01-15 PROCEDURE — G0151 HHCP-SERV OF PT,EA 15 MIN: HCPCS

## 2020-01-15 PROCEDURE — 3331090002 HH PPS REVENUE DEBIT

## 2020-01-15 PROCEDURE — 3331090001 HH PPS REVENUE CREDIT

## 2020-01-15 PROCEDURE — 400013 HH SOC

## 2020-01-16 ENCOUNTER — DOCUMENTATION ONLY (OUTPATIENT)
Dept: HOME HEALTH SERVICES | Facility: HOME HEALTH | Age: 85
End: 2020-01-16

## 2020-01-16 ENCOUNTER — HOME CARE VISIT (OUTPATIENT)
Dept: HOME HEALTH SERVICES | Facility: HOME HEALTH | Age: 85
End: 2020-01-16
Payer: MEDICARE

## 2020-01-16 VITALS
OXYGEN SATURATION: 97 % | RESPIRATION RATE: 16 BRPM | TEMPERATURE: 97.6 F | SYSTOLIC BLOOD PRESSURE: 150 MMHG | DIASTOLIC BLOOD PRESSURE: 80 MMHG | HEART RATE: 89 BPM

## 2020-01-16 DIAGNOSIS — Z78.9 IMPAIRED MOBILITY AND ADLS: ICD-10-CM

## 2020-01-16 DIAGNOSIS — R41.3 MEMORY DEFICIT: Primary | ICD-10-CM

## 2020-01-16 DIAGNOSIS — Z74.09 IMPAIRED MOBILITY AND ADLS: ICD-10-CM

## 2020-01-16 PROCEDURE — 3331090001 HH PPS REVENUE CREDIT

## 2020-01-16 PROCEDURE — 3331090002 HH PPS REVENUE DEBIT

## 2020-01-17 ENCOUNTER — HOME CARE VISIT (OUTPATIENT)
Dept: HOME HEALTH SERVICES | Facility: HOME HEALTH | Age: 85
End: 2020-01-17
Payer: MEDICARE

## 2020-01-17 ENCOUNTER — HOME CARE VISIT (OUTPATIENT)
Dept: SCHEDULING | Facility: HOME HEALTH | Age: 85
End: 2020-01-17
Payer: MEDICARE

## 2020-01-17 VITALS — HEART RATE: 80 BPM | DIASTOLIC BLOOD PRESSURE: 55 MMHG | TEMPERATURE: 98.6 F | SYSTOLIC BLOOD PRESSURE: 105 MMHG

## 2020-01-17 PROCEDURE — 3331090002 HH PPS REVENUE DEBIT

## 2020-01-17 PROCEDURE — G0157 HHC PT ASSISTANT EA 15: HCPCS

## 2020-01-17 PROCEDURE — 3331090001 HH PPS REVENUE CREDIT

## 2020-01-18 ENCOUNTER — HOME CARE VISIT (OUTPATIENT)
Dept: HOME HEALTH SERVICES | Facility: HOME HEALTH | Age: 85
End: 2020-01-18
Payer: MEDICARE

## 2020-01-18 PROCEDURE — 3331090002 HH PPS REVENUE DEBIT

## 2020-01-18 PROCEDURE — 3331090001 HH PPS REVENUE CREDIT

## 2020-01-19 PROCEDURE — 3331090002 HH PPS REVENUE DEBIT

## 2020-01-19 PROCEDURE — 3331090001 HH PPS REVENUE CREDIT

## 2020-01-20 PROCEDURE — 3331090002 HH PPS REVENUE DEBIT

## 2020-01-20 PROCEDURE — 3331090001 HH PPS REVENUE CREDIT

## 2020-01-21 ENCOUNTER — HOME CARE VISIT (OUTPATIENT)
Dept: SCHEDULING | Facility: HOME HEALTH | Age: 85
End: 2020-01-21
Payer: MEDICARE

## 2020-01-21 ENCOUNTER — HOME CARE VISIT (OUTPATIENT)
Dept: HOME HEALTH SERVICES | Facility: HOME HEALTH | Age: 85
End: 2020-01-21
Payer: MEDICARE

## 2020-01-21 VITALS
TEMPERATURE: 99.4 F | HEART RATE: 82 BPM | DIASTOLIC BLOOD PRESSURE: 80 MMHG | SYSTOLIC BLOOD PRESSURE: 120 MMHG | OXYGEN SATURATION: 99 %

## 2020-01-21 VITALS — SYSTOLIC BLOOD PRESSURE: 122 MMHG | TEMPERATURE: 98.8 F | HEART RATE: 73 BPM | DIASTOLIC BLOOD PRESSURE: 60 MMHG

## 2020-01-21 PROCEDURE — 3331090002 HH PPS REVENUE DEBIT

## 2020-01-21 PROCEDURE — 3331090001 HH PPS REVENUE CREDIT

## 2020-01-21 PROCEDURE — G0153 HHCP-SVS OF S/L PATH,EA 15MN: HCPCS

## 2020-01-21 PROCEDURE — G0157 HHC PT ASSISTANT EA 15: HCPCS

## 2020-01-22 ENCOUNTER — HOME CARE VISIT (OUTPATIENT)
Dept: SCHEDULING | Facility: HOME HEALTH | Age: 85
End: 2020-01-22
Payer: MEDICARE

## 2020-01-22 PROCEDURE — 3331090001 HH PPS REVENUE CREDIT

## 2020-01-22 PROCEDURE — 3331090002 HH PPS REVENUE DEBIT

## 2020-01-22 PROCEDURE — G0151 HHCP-SERV OF PT,EA 15 MIN: HCPCS

## 2020-01-23 ENCOUNTER — HOME CARE VISIT (OUTPATIENT)
Dept: SCHEDULING | Facility: HOME HEALTH | Age: 85
End: 2020-01-23
Payer: MEDICARE

## 2020-01-23 VITALS — SYSTOLIC BLOOD PRESSURE: 114 MMHG | TEMPERATURE: 99.3 F | DIASTOLIC BLOOD PRESSURE: 72 MMHG

## 2020-01-23 PROCEDURE — 3331090002 HH PPS REVENUE DEBIT

## 2020-01-23 PROCEDURE — 3331090001 HH PPS REVENUE CREDIT

## 2020-01-23 PROCEDURE — G0156 HHCP-SVS OF AIDE,EA 15 MIN: HCPCS

## 2020-01-24 ENCOUNTER — HOME CARE VISIT (OUTPATIENT)
Dept: SCHEDULING | Facility: HOME HEALTH | Age: 85
End: 2020-01-24
Payer: MEDICARE

## 2020-01-24 VITALS
DIASTOLIC BLOOD PRESSURE: 77 MMHG | SYSTOLIC BLOOD PRESSURE: 121 MMHG | TEMPERATURE: 97.2 F | HEART RATE: 72 BPM | OXYGEN SATURATION: 99 %

## 2020-01-24 PROCEDURE — G0157 HHC PT ASSISTANT EA 15: HCPCS

## 2020-01-24 PROCEDURE — 3331090001 HH PPS REVENUE CREDIT

## 2020-01-24 PROCEDURE — G0152 HHCP-SERV OF OT,EA 15 MIN: HCPCS

## 2020-01-24 PROCEDURE — 3331090002 HH PPS REVENUE DEBIT

## 2020-01-25 PROCEDURE — 3331090002 HH PPS REVENUE DEBIT

## 2020-01-25 PROCEDURE — 3331090001 HH PPS REVENUE CREDIT

## 2020-01-26 VITALS — SYSTOLIC BLOOD PRESSURE: 120 MMHG | TEMPERATURE: 74 F | HEART RATE: 98 BPM | DIASTOLIC BLOOD PRESSURE: 80 MMHG

## 2020-01-26 PROCEDURE — 3331090001 HH PPS REVENUE CREDIT

## 2020-01-26 PROCEDURE — 3331090002 HH PPS REVENUE DEBIT

## 2020-01-27 ENCOUNTER — HOME CARE VISIT (OUTPATIENT)
Dept: SCHEDULING | Facility: HOME HEALTH | Age: 85
End: 2020-01-27
Payer: MEDICARE

## 2020-01-27 VITALS
OXYGEN SATURATION: 98 % | SYSTOLIC BLOOD PRESSURE: 120 MMHG | HEART RATE: 78 BPM | TEMPERATURE: 97 F | DIASTOLIC BLOOD PRESSURE: 63 MMHG

## 2020-01-27 PROCEDURE — 3331090001 HH PPS REVENUE CREDIT

## 2020-01-27 PROCEDURE — G0152 HHCP-SERV OF OT,EA 15 MIN: HCPCS

## 2020-01-27 PROCEDURE — 3331090002 HH PPS REVENUE DEBIT

## 2020-01-28 ENCOUNTER — HOME CARE VISIT (OUTPATIENT)
Dept: SCHEDULING | Facility: HOME HEALTH | Age: 85
End: 2020-01-28
Payer: MEDICARE

## 2020-01-28 VITALS — DIASTOLIC BLOOD PRESSURE: 93 MMHG | HEART RATE: 72 BPM | SYSTOLIC BLOOD PRESSURE: 127 MMHG | TEMPERATURE: 98.9 F

## 2020-01-28 VITALS
HEART RATE: 73 BPM | SYSTOLIC BLOOD PRESSURE: 110 MMHG | TEMPERATURE: 98.4 F | OXYGEN SATURATION: 97 % | DIASTOLIC BLOOD PRESSURE: 60 MMHG

## 2020-01-28 LAB — INR, EXTERNAL: 1.8

## 2020-01-28 PROCEDURE — 3331090001 HH PPS REVENUE CREDIT

## 2020-01-28 PROCEDURE — 3331090002 HH PPS REVENUE DEBIT

## 2020-01-28 PROCEDURE — G0153 HHCP-SVS OF S/L PATH,EA 15MN: HCPCS

## 2020-01-28 PROCEDURE — G0157 HHC PT ASSISTANT EA 15: HCPCS

## 2020-01-28 PROCEDURE — G0156 HHCP-SVS OF AIDE,EA 15 MIN: HCPCS

## 2020-01-29 ENCOUNTER — TELEPHONE ANTICOAG (OUTPATIENT)
Dept: CARDIOLOGY CLINIC | Age: 85
End: 2020-01-29

## 2020-01-29 PROCEDURE — 3331090002 HH PPS REVENUE DEBIT

## 2020-01-29 PROCEDURE — 3331090001 HH PPS REVENUE CREDIT

## 2020-01-29 NOTE — PROGRESS NOTES
Verbal order and read back per Corinne Aguilar NP  
INR below therapeutic range: 1.8(1/28/2020). Extra 2.5mg Coumadin today; then Coumadin 5mg daily except 7.5mg Mon-Wed-Fri. Recheck 1 week. Spoke with caregiver, daughter-in-law and dosing and recheck instructions given. No questions.

## 2020-01-30 ENCOUNTER — HOME CARE VISIT (OUTPATIENT)
Dept: SCHEDULING | Facility: HOME HEALTH | Age: 85
End: 2020-01-30
Payer: MEDICARE

## 2020-01-30 VITALS — DIASTOLIC BLOOD PRESSURE: 78 MMHG | TEMPERATURE: 99.1 F | SYSTOLIC BLOOD PRESSURE: 124 MMHG

## 2020-01-30 PROCEDURE — G0157 HHC PT ASSISTANT EA 15: HCPCS

## 2020-01-30 PROCEDURE — 3331090002 HH PPS REVENUE DEBIT

## 2020-01-30 PROCEDURE — 3331090001 HH PPS REVENUE CREDIT

## 2020-01-30 PROCEDURE — G0156 HHCP-SVS OF AIDE,EA 15 MIN: HCPCS

## 2020-01-31 ENCOUNTER — HOME CARE VISIT (OUTPATIENT)
Dept: SCHEDULING | Facility: HOME HEALTH | Age: 85
End: 2020-01-31
Payer: MEDICARE

## 2020-01-31 ENCOUNTER — HOME CARE VISIT (OUTPATIENT)
Dept: HOME HEALTH SERVICES | Facility: HOME HEALTH | Age: 85
End: 2020-01-31
Payer: MEDICARE

## 2020-01-31 VITALS
TEMPERATURE: 98.8 F | SYSTOLIC BLOOD PRESSURE: 120 MMHG | DIASTOLIC BLOOD PRESSURE: 66 MMHG | OXYGEN SATURATION: 96 % | HEART RATE: 79 BPM

## 2020-01-31 PROCEDURE — 3331090002 HH PPS REVENUE DEBIT

## 2020-01-31 PROCEDURE — G0157 HHC PT ASSISTANT EA 15: HCPCS

## 2020-01-31 PROCEDURE — G0158 HHC OT ASSISTANT EA 15: HCPCS

## 2020-01-31 PROCEDURE — 3331090001 HH PPS REVENUE CREDIT

## 2020-02-01 ENCOUNTER — HOME CARE VISIT (OUTPATIENT)
Dept: HOME HEALTH SERVICES | Facility: HOME HEALTH | Age: 85
End: 2020-02-01
Payer: MEDICARE

## 2020-02-01 PROCEDURE — 3331090002 HH PPS REVENUE DEBIT

## 2020-02-01 PROCEDURE — 3331090001 HH PPS REVENUE CREDIT

## 2020-02-02 PROCEDURE — 3331090002 HH PPS REVENUE DEBIT

## 2020-02-02 PROCEDURE — 3331090001 HH PPS REVENUE CREDIT

## 2020-02-03 ENCOUNTER — TELEPHONE (OUTPATIENT)
Dept: FAMILY MEDICINE CLINIC | Age: 85
End: 2020-02-03

## 2020-02-03 ENCOUNTER — HOME CARE VISIT (OUTPATIENT)
Dept: SCHEDULING | Facility: HOME HEALTH | Age: 85
End: 2020-02-03
Payer: MEDICARE

## 2020-02-03 VITALS
SYSTOLIC BLOOD PRESSURE: 124 MMHG | DIASTOLIC BLOOD PRESSURE: 72 MMHG | TEMPERATURE: 99.5 F | HEART RATE: 52 BPM | OXYGEN SATURATION: 99 %

## 2020-02-03 VITALS — SYSTOLIC BLOOD PRESSURE: 116 MMHG | HEART RATE: 74 BPM | OXYGEN SATURATION: 97 % | DIASTOLIC BLOOD PRESSURE: 64 MMHG

## 2020-02-03 PROCEDURE — 3331090001 HH PPS REVENUE CREDIT

## 2020-02-03 PROCEDURE — G0157 HHC PT ASSISTANT EA 15: HCPCS

## 2020-02-03 PROCEDURE — 3331090002 HH PPS REVENUE DEBIT

## 2020-02-03 NOTE — TELEPHONE ENCOUNTER
Pt fell Friday night and she is bruised on the  Right side and right arm. Peace Decker stated pt said  pain is 8 out of [facility] [phone]. Pt has an appointment on the 5th of February. Peace Decker only request that appointment just incase she needs it, if not please cancel. Please call Peace Decker at your earliest convenience.

## 2020-02-04 ENCOUNTER — HOME CARE VISIT (OUTPATIENT)
Dept: SCHEDULING | Facility: HOME HEALTH | Age: 85
End: 2020-02-04
Payer: MEDICARE

## 2020-02-04 PROCEDURE — G0158 HHC OT ASSISTANT EA 15: HCPCS

## 2020-02-04 PROCEDURE — 3331090001 HH PPS REVENUE CREDIT

## 2020-02-04 PROCEDURE — 3331090002 HH PPS REVENUE DEBIT

## 2020-02-04 PROCEDURE — G0153 HHCP-SVS OF S/L PATH,EA 15MN: HCPCS

## 2020-02-04 PROCEDURE — G0156 HHCP-SVS OF AIDE,EA 15 MIN: HCPCS

## 2020-02-04 NOTE — TELEPHONE ENCOUNTER
Called and left message for Ayla Allen to advise if patient is still having pain at 8/10 since Friday to have her keep appointment for tomorrow.  If significantly improved, call and cancel

## 2020-02-05 ENCOUNTER — HOSPITAL ENCOUNTER (OUTPATIENT)
Dept: GENERAL RADIOLOGY | Age: 85
Discharge: HOME | End: 2020-02-05
Payer: MEDICARE

## 2020-02-05 ENCOUNTER — HOME CARE VISIT (OUTPATIENT)
Dept: SCHEDULING | Facility: HOME HEALTH | Age: 85
End: 2020-02-05
Payer: MEDICARE

## 2020-02-05 ENCOUNTER — OFFICE VISIT (OUTPATIENT)
Dept: FAMILY MEDICINE CLINIC | Age: 85
End: 2020-02-05

## 2020-02-05 VITALS
OXYGEN SATURATION: 98 % | BODY MASS INDEX: 25.68 KG/M2 | HEART RATE: 101 BPM | RESPIRATION RATE: 16 BRPM | TEMPERATURE: 97.7 F | HEIGHT: 59 IN | SYSTOLIC BLOOD PRESSURE: 128 MMHG | WEIGHT: 127.4 LBS | DIASTOLIC BLOOD PRESSURE: 62 MMHG

## 2020-02-05 VITALS — TEMPERATURE: 97.8 F | HEART RATE: 82 BPM | SYSTOLIC BLOOD PRESSURE: 130 MMHG | DIASTOLIC BLOOD PRESSURE: 70 MMHG

## 2020-02-05 DIAGNOSIS — M54.9 UPPER BACK PAIN ON RIGHT SIDE: ICD-10-CM
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
## 2020-02-05 DIAGNOSIS — N39.41 URGE INCONTINENCE OF URINE: ICD-10-CM

## 2020-02-05 DIAGNOSIS — R41.3 MEMORY CHANGES: ICD-10-CM

## 2020-02-05 DIAGNOSIS — T14.8XXA BRUISE: ICD-10-CM

## 2020-02-05 DIAGNOSIS — R14.2 BURPING: ICD-10-CM

## 2020-02-05 DIAGNOSIS — T14.8XXA BRUISE: Primary | ICD-10-CM

## 2020-02-05 LAB — INR, EXTERNAL: 2.4

## 2020-02-05 PROCEDURE — 3331090002 HH PPS REVENUE DEBIT

## 2020-02-05 PROCEDURE — 71100 X-RAY EXAM RIBS UNI 2 VIEWS: CPT

## 2020-02-05 PROCEDURE — 3331090001 HH PPS REVENUE CREDIT

## 2020-02-05 RX ORDER — PANTOPRAZOLE SODIUM 20 MG/1
20 TABLET, DELAYED RELEASE ORAL DAILY
Qty: 30 TAB | Refills: 1 | Status: SHIPPED | OUTPATIENT
Start: 2020-02-05 | End: 2020-01-01

## 2020-02-05 NOTE — PROGRESS NOTES
1. Have you been to the ER, urgent care clinic since your last visit? Hospitalized since your last visit? No    2. Have you seen or consulted any other health care providers outside of the [address] [place] [address] since your last visit? Include any pap smears or colon screening. [id number] LOV: 12/16/2019.     Chief Complaint   Patient presents with    Fall     fall on Friday 1/30/2020 and bruise under right arm    Other     hurts on sides when breathing    Medication Evaluation     requests stronger dose for Detrol-LA

## 2020-02-05 NOTE — PATIENT INSTRUCTIONS
Bruises: Care Instructions  Your Care Instructions    Bruises occur when small blood vessels under the skin tear or rupture, most often from a twist, bump, or fall. Blood leaks into tissues under the skin and causes a black-and-blue spot that often turns colors, including purplish black, reddish blue, or yellowish green, as the bruise heals. Bruises hurt, but most are not serious and will go away on their own within 2 to 4 weeks. Sometimes, gravity causes them to spread down the body. A leg bruise usually will take longer to heal than a bruise on the face or arms. Follow-up care is a key part of your treatment and safety. Be sure to make and go to all appointments, and call your doctor if you are having problems. It's also a good idea to know your test results and keep a list of the medicines you take. How can you care for yourself at home? · Take pain medicines exactly as directed. ? If the doctor gave you a prescription medicine for pain, take it as prescribed. ? If you are not taking a prescription pain medicine, ask your doctor if you can take an over-the-counter medicine. · Put ice or a cold pack on the area for 10 to 20 minutes at a time. Put a thin cloth between the ice and your skin. · If you can, prop up the bruised area on pillows as much as possible for the next few days. Try to keep the bruise above the level of your heart. When should you call for help? Call your doctor now or seek immediate medical care if:    · You have signs of infection, such as:  ? Increased pain, swelling, warmth, or redness. ? Red streaks leading from the bruise. ? Pus draining from the bruise. ? A fever.     · You have a bruise on your leg and signs of a blood clot, such as:  ? Increasing redness and swelling along with warmth, tenderness, and pain in the bruised area. ? Pain in your calf, back of the knee, thigh, or groin. ?  Redness and swelling in your leg or groin.     · Your pain gets worse.   Bebe Grief closely for changes in your health, and be sure to contact your doctor if:    · You do not get better as expected. Where can you learn more? Go to http://pedro-abiodun.info/. Enter [phone] in the search box to learn more about \"Bruises: Care Instructions. \"  Current as of: June 26, 2019  Content Version: 12.2  © 0676-4781 [employer]. Care instructions adapted under license by [employer] (which disclaims liability or warranty for this information). If you have questions about a medical condition or this instruction, always ask your healthcare professional. [place] [address] any warranty or liability for your use of this information. Gastroesophageal Reflux Disease (GERD): Care Instructions  Your Care Instructions    Gastroesophageal reflux disease (GERD) is the backward flow of stomach acid into the esophagus. The esophagus is the tube that leads from your throat to your stomach. A one-way valve prevents the stomach acid from moving up into this tube. When you have GERD, this valve does not close tightly enough. If you have mild GERD symptoms including heartburn, you may be able to control the problem with antacids or over-the-counter medicine. Changing your diet, losing weight, and making other lifestyle changes can also help reduce symptoms. Follow-up care is a key part of your treatment and safety. Be sure to make and go to all appointments, and call your doctor if you are having problems. It's also a good idea to know your test results and keep a list of the medicines you take. How can you care for yourself at home? · Take your medicines exactly as prescribed. Call your doctor if you think you are having a problem with your medicine. · Your doctor may recommend over-the-counter medicine. For mild or occasional indigestion, antacids, such as Tums, Gaviscon, Mylanta, or Maalox, may help.  Your doctor also may recommend over-the-counter acid reducers, such as Pepcid AC, Tagamet HB, Zantac 75, or Prilosec. Read and follow all instructions on the label. If you use these medicines often, talk with your doctor. · Change your eating habits. ? It's best to eat several small meals instead of two or three large meals. ? After you eat, wait 2 to 3 hours before you lie down. ? Chocolate, mint, and alcohol can make GERD worse. ? Spicy foods, foods that have a lot of acid (like tomatoes and oranges), and coffee can make GERD symptoms worse in some people. If your symptoms are worse after you eat a certain food, you may want to stop eating that food to see if your symptoms get better. · Do not smoke or chew tobacco. Smoking can make GERD worse. If you need help quitting, talk to your doctor about stop-smoking programs and medicines. These can increase your chances of quitting for good. · If you have GERD symptoms at night, raise the head of your bed 6 to 8 inches by putting the frame on blocks or placing a foam wedge under the head of your mattress. (Adding extra pillows does not work.)  · Do not wear tight clothing around your middle. · Lose weight if you need to. Losing just 5 to 10 pounds can help. When should you call for help? Call your doctor now or seek immediate medical care if:    · You have new or different belly pain.     · Your stools are black and tarlike or have streaks of blood.    Watch closely for changes in your health, and be sure to contact your doctor if:    · Your symptoms have not improved after 2 days.     · Food seems to catch in your throat or chest.   Where can you learn more? Go to http://pedro-abiodun.info/. Enter K855 in the search box to learn more about \"Gastroesophageal Reflux Disease (GERD): Care Instructions. \"  Current as of: November 7, 2018  Content Version: 12.2  © 5878-6903 [employer].  Care instructions adapted under license by [employer] (which disclaims liability or warranty for this information). If you have questions about a medical condition or this instruction, always ask your healthcare professional. [place] [address] any warranty or liability for your use of this information.

## 2020-02-05 NOTE — PROGRESS NOTES
HISTORY OF PRESENT ILLNESS  Maximiliano Arrieta is a 80 y.o. female. HPI: here with her duaghter in law. Said Friday night she fell while going to get her walker while coming out of her bed on her own. Almost it was midnight. Daughter in law heard a noise and noted pt was sitting on a floor. Per patient walker was little far than she could reach to. She fell on her rt side and noted bruise over rt upper back and rt inner arm. No open skin. Said when she has direct touch over affected area and takes a deep breath she feels mild to moderate pain. No wheezing. No cough. Able to ambulate without any discomfort. Using walker all the time to prevent fall. No leg swelling or any bruise anywhere. Said she did not hit her head. Denies any dizziness, no nausea or vomiting. No chest pain. No diaphoresis. During visit sitting very comfortable on a wheel chair. No cough or wheezing. Not using any extra respiratory muscle. She has vitals fairly stable. She is on coumadin. Said home health nurse came and checked on bruise and was not concern. Rt inner arm bruise is healing faster than rt upper back one. No fever. Also said her PT/ OT helped for her ADL< strength. Following their recommendations. Also had a speech eval and noted significant dementia. She has neuropsychology evaluation pending. No depression or anxiety . Recently lost her . Not taking any dementia medication. Will f/u after her evaluation. No behavior changes . C/o burping a lot. Said used to take Prilosec and has left over from the past.   Advised to take it as needed. No abdominal pain. No bowel complains. Has urine incontinence. Said detrol LA helped but very little. Wondering if can go up on the dose. For now will observe as it is anticholinergic medication and can cause side effects of dizziness ? Risk of fall. No dysuria. She is oriented and able to recognize people in the room.    Visit Vitals  /62 (BP 1 Location: Left arm, BP Patient Position: Sitting)   Pulse (!) 101   Temp 97.7 °F (36.5 °C) (Oral)   Resp 16   Ht 4' 11\" (1.499 m)   Wt 127 lb 6.4 oz (57.8 kg)   SpO2 98%   BMI 25.73 kg/m²     Review medication list, vitals, problem list,allergies. ROS: see HPI     Physical Exam  Cardiovascular:      Rate and Rhythm: Normal rate and regular rhythm. Pulmonary:      Effort: Pulmonary effort is normal. No respiratory distress. Breath sounds: No wheezing. Comments: Bruise over rt lower upper back. Localized tenderness over affected area. Abdominal:      Palpations: Abdomen is soft. Tenderness: There is no abdominal tenderness. Musculoskeletal:         General: No swelling. Skin:         Neurological:      General: No focal deficit present. Mental Status: She is alert. ASSESSMENT and PLAN    ICD-10-CM ICD-9-CM    1. Bruise: from fall. On coumadin. Level theraputic per daughter in law as just got checked today. Will obtain x-ray. Discussed takes 2-3 wks for bruise to resolve. Can do ice and tylenol as needed. T14. 8XXA 924.9 XR RIBS RT UNI 2 V   2. Upper back pain on right side M54.9 724.5 XR RIBS RT UNI 2 V   3. Fall, initial encounter [address]. XXXA E888.9 XR RIBS RT UNI 2 V   4. Burping: prilosec as needed. She has medication at home. R14.2 787.3    5. Urge incontinence of urine: for now observe on current dose of detrol LA  N39.41 788.31    6. Memory changes: pending neuropsychology eval. On speech eval 4/30 score. Will f/u after eval.  R41.3 780.93     dementia per home health speech therapy evaluation. she has an evaluation pending with neuropsychologist . will f/u after their eval.    Pt understood and agree with the plan   Review    Follow-up and Dispositions    · Return in about 3 weeks (around 2/26/2020).

## 2020-02-06 ENCOUNTER — TELEPHONE (OUTPATIENT)
Dept: FAMILY MEDICINE CLINIC | Age: 85
End: 2020-02-06

## 2020-02-06 ENCOUNTER — TELEPHONE ANTICOAG (OUTPATIENT)
Dept: CARDIOLOGY CLINIC | Age: 85
End: 2020-02-06

## 2020-02-06 ENCOUNTER — HOME CARE VISIT (OUTPATIENT)
Dept: SCHEDULING | Facility: HOME HEALTH | Age: 85
End: 2020-02-06
Payer: MEDICARE

## 2020-02-06 DIAGNOSIS — S12.9XXS COMPRESSION FRACTURE OF CERVICAL VERTEBRA, SEQUELA: Primary | ICD-10-CM

## 2020-02-06 DIAGNOSIS — I48.20 CHRONIC A-FIB (HCC): Primary | ICD-10-CM

## 2020-02-06 DIAGNOSIS — M81.0 OSTEOPOROSIS OF VERTEBRA: ICD-10-CM

## 2020-02-06 DIAGNOSIS — M25.511 RIGHT SHOULDER PAIN, UNSPECIFIED CHRONICITY: Primary | ICD-10-CM

## 2020-02-06 DIAGNOSIS — S32.020S COMPRESSION FRACTURE OF L2 VERTEBRA, SEQUELA: ICD-10-CM

## 2020-02-06 PROCEDURE — 3331090001 HH PPS REVENUE CREDIT

## 2020-02-06 PROCEDURE — 3331090002 HH PPS REVENUE DEBIT

## 2020-02-06 NOTE — PROGRESS NOTES
XR Results (most recent):  Results from Hospital Encounter encounter on 02/05/20   XR RIBS RT UNI 2 V    Narrative Ribs:  Right    Indications: Right rib pain after recent fall. Bruise across mid back. .    3 views of the right ribs are submitted. The study is limited due to the  patient's severe osseous demineralization. I do not see a right rib fracture  with certainty. Severe degenerative changes are seen involving the right  shoulder. There are multiple compression fractures of the lower thoracic spine  and upper lumbar spine of uncertain age. Impression Impression:      1. Limited study due to severe osseous demineralization. No definite right rib  fracture seen with certainty. 2. Multiple compression fractures of uncertain age. Thoracic and lumbar spine  films should be considered. 3. Severe degenerative changes right shoulder.

## 2020-02-06 NOTE — TELEPHONE ENCOUNTER
Patient called regarding recent x-ray results. Please call patient back at your earliest convenience.

## 2020-02-06 NOTE — PROGRESS NOTES
Let pt know that no acute fracture but noted osteoporotic changes and prior compression fracture. For now will send her to rheumatology for further evaluation on osteoporosis/ compression fracture, need for medication.

## 2020-02-06 NOTE — PROGRESS NOTES
Verbal order and read back per Ratna Javed NP  INR within therapeutic range: 2.4 (2/5/2020)   Coumadin 5mg daily except 7.5mg Mon-Wed-Fri. Recheck 2 week. Patient's guardian made aware of dosing and recheck instructions and no questions.

## 2020-02-07 ENCOUNTER — HOME CARE VISIT (OUTPATIENT)
Dept: SCHEDULING | Facility: HOME HEALTH | Age: 85
End: 2020-02-07
Payer: MEDICARE

## 2020-02-07 ENCOUNTER — PATIENT MESSAGE (OUTPATIENT)
Dept: FAMILY MEDICINE CLINIC | Age: 85
End: 2020-02-07

## 2020-02-07 VITALS
SYSTOLIC BLOOD PRESSURE: 122 MMHG | RESPIRATION RATE: 14 BRPM | DIASTOLIC BLOOD PRESSURE: 71 MMHG | HEART RATE: 73 BPM | OXYGEN SATURATION: 98 % | TEMPERATURE: 97.1 F

## 2020-02-07 DIAGNOSIS — N39.41 URGE INCONTINENCE OF URINE: ICD-10-CM

## 2020-02-07 PROCEDURE — G0151 HHCP-SERV OF PT,EA 15 MIN: HCPCS

## 2020-02-07 PROCEDURE — G0156 HHCP-SVS OF AIDE,EA 15 MIN: HCPCS

## 2020-02-07 PROCEDURE — 3331090001 HH PPS REVENUE CREDIT

## 2020-02-07 PROCEDURE — 3331090002 HH PPS REVENUE DEBIT

## 2020-02-07 RX ORDER — TOLTERODINE 2 MG/1
CAPSULE, EXTENDED RELEASE ORAL
Qty: 30 CAP | Refills: 0 | Status: SHIPPED | OUTPATIENT
Start: 2020-02-07 | End: 2020-01-01

## 2020-02-07 NOTE — TELEPHONE ENCOUNTER
Spoke with patient's daughter and she verbalized understanding of results. See results note for completion.

## 2020-02-07 NOTE — PROGRESS NOTES
Contacted patient's daughter and verified identity using name and date of birth (2- identifiers)  Advised of no acute fracture but osteoporotic changes and prior compression fractures. Need for Ortho and Rheumatology appointment. Daughter said she will reschedule the rheum appointment as they missed it having to come here.

## 2020-02-08 ENCOUNTER — HOME CARE VISIT (OUTPATIENT)
Dept: SCHEDULING | Facility: HOME HEALTH | Age: 85
End: 2020-02-08
Payer: MEDICARE

## 2020-02-08 PROCEDURE — 3331090001 HH PPS REVENUE CREDIT

## 2020-02-08 PROCEDURE — 3331090002 HH PPS REVENUE DEBIT

## 2020-02-09 PROCEDURE — 3331090002 HH PPS REVENUE DEBIT

## 2020-02-09 PROCEDURE — 3331090001 HH PPS REVENUE CREDIT

## 2020-02-13 NOTE — PROGRESS NOTES
I have personally seen and evaluated the device findings. Interrogation reviewed and I agree with assessment.     Boris Hayes

## 2020-02-20 NOTE — PROGRESS NOTES
Verbal order and read back per Margaret Sawyer NP  INR within therapeutic range: 2.6 (2/19/2020)   Coumadin 5mg daily except 7.5mg Mon-Wed-Fri. Recheck 2 week. Patient's guardian Macario Miller made aware of dosing and recheck instructions, no questions.

## 2020-02-24 NOTE — PROGRESS NOTES
HISTORY OF PRESENT ILLNESS  Mark Leon is a 80 y.o. female. HPI: Here for follow-up after fall. Last visit due to fall she had a pain over the right upper back. X-ray of the ribs showed no fracture. Also she had right shoulder pain. X-ray showed arthritic changes. Gradually it is improving. Currently said right side upper back pain along the back rib area has been improved. Still has a right shoulder pain but gradually getting better. No radiation of the pain in upper extremity. No tingling numbness or weakness in right upper extremity. No neck pain. Also after fall she had a big bruise over the right upper arm. It has been resolved. In the rib x-ray also noted multiple compression fracture and recommended thoracic and lumbar spine film. Results as below. She has a pending appointment with Dr. Paddy Jean. Will follow the recommendation regarding further imaging study and further treatment recommendation. Said she does get on and off back pain. Per patient prior history of osteopenia but never been diagnosed with osteoporosis. Does not recall that she been ever on the treatment for that. Will consider DEXA scan depends on spine center recommendation. For now she is on calcium and vitamin D.  XR Results (most recent):  Results from Hospital Encounter encounter on 02/05/20   XR RIBS RT UNI 2 V    Narrative Ribs:  Right    Indications: Right rib pain after recent fall. Bruise across mid back. .    3 views of the right ribs are submitted. The study is limited due to the  patient's severe osseous demineralization. I do not see a right rib fracture  with certainty. Severe degenerative changes are seen involving the right  shoulder. There are multiple compression fractures of the lower thoracic spine  and upper lumbar spine of uncertain age. Impression Impression:      1. Limited study due to severe osseous demineralization. No definite right rib  fracture seen with certainty.   2. Multiple compression fractures of uncertain age. Thoracic and lumbar spine  films should be considered. 3. Severe degenerative changes right shoulder. She was given PPI to take as needed for bloating and it has been stable. No appetite or weight changes. No nausea vomiting or abdominal pain. No bowel complaints. History of dementia. Pending evaluation for neuropsychology. Daughter-in-law is aware regarding both appointments with orthopedic and neuropsychology evaluation. Concern with urine incontinence. Was given Detrol LA. For now advised to take 1 tablet in the morning along with other tab medications would be fine. ROS: see HPI     Physical Exam  Constitutional:       General: She is not in acute distress. Cardiovascular:      Rate and Rhythm: Normal rate and regular rhythm. Heart sounds: Normal heart sounds. Abdominal:      General: Bowel sounds are normal.      Palpations: Abdomen is soft. Tenderness: There is no abdominal tenderness. Neurological:      Mental Status: She is oriented to person, place, and time. Psychiatric:         Behavior: Behavior normal.         ASSESSMENT and PLAN    ICD-10-CM ICD-9-CM    1. Upper back pain: No point tenderness over thoracic or lumbar spine during visit. No pain over back of the repeat right upper back where she had tenderness  during last visit. For now heating pad. Over-the-counter Tylenol as needed. M54.9 724.5     rt side from fall. 2. Bloating: PPI as needed. R14.0 787.3    3. Memory changes: Pending neuropsychological evaluation. Will follow the recommendation. R41.3 780.93    4. Irregular heart beat: She has pacemaker. Following Dr. Erika Metcalf. I49.9 427.9    5. Right shoulder pain, unspecified chronicity: X-ray showed arthritic changes. For now will observe with the symptomatic treatment. If pain goes worse will consider orthopedic appointment.   Right now she has a pending appointment at spine center to evaluate multiple compression fracture. On and off back pain. She was sitting comfortably and without any acute pain during the visit. M25.511 719.41    Patient and daughter-in-law both understood and agreed with the plan. Follow-up and Dispositions    · Return in about 3 months (around 5/24/2020).

## 2020-02-24 NOTE — PROGRESS NOTES
Chief Complaint   Patient presents with    Irregular Heart Beat     follow-up    Memory Loss     follow-up     1. Have you been to the ER, urgent care clinic since your last visit? Hospitalized since your last visit? No    2. Have you seen or consulted any other health care providers outside of the [address] [place] [address] since your last visit? Include any pap smears or colon screening.  No

## 2020-02-24 NOTE — PATIENT INSTRUCTIONS
Arthritis: Care Instructions  Your Care Instructions  Arthritis, also called osteoarthritis, is a breakdown of the cartilage that cushions your joints. When the cartilage wears down, your bones rub against each other. This causes pain and stiffness. Many people have some arthritis as they age. Arthritis most often affects the joints of the spine, hands, hips, knees, or feet. You can take simple measures to protect your joints, ease your pain, and help you stay active. Follow-up care is a key part of your treatment and safety. Be sure to make and go to all appointments, and call your doctor if you are having problems. It's also a good idea to know your test results and keep a list of the medicines you take. How can you care for yourself at home? · Stay at a healthy weight. Being overweight puts extra strain on your joints. · Talk to your doctor or physical therapist about exercises that will help ease joint pain. ? Stretch. You may enjoy gentle forms of yoga to help keep your joints and muscles flexible. ? Walk instead of jog. Other types of exercise that are less stressful on the joints include riding a bicycle, swimming, penny chi, or water exercise. ? Lift weights. Strong muscles help reduce stress on your joints. Stronger thigh muscles, for example, take some of the stress off of the knees and hips. Learn the right way to lift weights so you do not make joint pain worse. · Take your medicines exactly as prescribed. Call your doctor if you think you are having a problem with your medicine. · Take pain medicines exactly as directed. ? If the doctor gave you a prescription medicine for pain, take it as prescribed. ? If you are not taking a prescription pain medicine, ask your doctor if you can take an over-the-counter medicine. · Use a cane, crutch, walker, or another device if you need help to get around. These can help rest your joints.  You also can use other things to make life easier, such as a higher toilet seat and padded handles on kitchen utensils. · Do not sit in low chairs, which can make it hard to get up. · Put heat or cold on your sore joints as needed. Use whichever helps you most. You also can take turns with hot and cold packs. ? Apply heat 2 or 3 times a day for 20 to 30 minutes--using a heating pad, hot shower, or hot pack--to relieve pain and stiffness. ? Put ice or a cold pack on your sore joint for 10 to 20 minutes at a time. Put a thin cloth between the ice and your skin. When should you call for help? Call your doctor now or seek immediate medical care if:    · You have sudden swelling, warmth, or pain in any joint.     · You have joint pain and a fever or rash.     · You have such bad pain that you cannot use a joint.    Watch closely for changes in your health, and be sure to contact your doctor if:    · You have mild joint symptoms that continue even with more than 6 weeks of care at home.     · You have stomach pain or other problems with your medicine. Where can you learn more? Go to http://pedro-abiodun.info/. Enter T729 in the search box to learn more about \"Arthritis: Care Instructions. \"  Current as of: April 1, 2019  Content Version: 12.2  © 5764-2074 [employer]. Care instructions adapted under license by [employer] (which disclaims liability or warranty for this information). If you have questions about a medical condition or this instruction, always ask your healthcare professional. [place] [address] any warranty or liability for your use of this information. Heart and Pacemaker: Anatomy Sketch    Current as of: April 9, 2019  Content Version: 12.2  © 8247-1619 [employer]. Care instructions adapted under license by [employer] (which disclaims liability or warranty for this information).  If you have questions about a medical condition or this instruction, always ask your healthcare professional. [address] any warranty or liability for your use of this information.

## 2020-03-06 NOTE — TELEPHONE ENCOUNTER
This patient contacted office for the following prescriptions to be filled:    Medication requested :   Requested Prescriptions     Pending Prescriptions Disp Refills    tolterodine ER (DETROL-LA) 2 mg ER capsule 30 Cap 0     PCP: [address] [place] [address] or Print: [facility]  Mail order or Local pharmacy [phone]    Scheduled appointment if not seen by current providers in office: LOV 1/13/2020 [facility] 2/24/2020

## 2020-03-09 NOTE — PROGRESS NOTES
[address] 
[address], [address] [place], [address] [place] [address] Street Phone: [phone] Fax: [phone] Bernardino Patience : 1934 PCP: Beronica Villarreal MD 
 
 
NEW PATIENT 
 
 
ASSESSMENT AND PLAN Diagnoses and all orders for this visit: 
 
1. History of compression fracture of spine -     DEXA BONE DENSITY STUDY AXIAL; Future 2. Spondylolisthesis at L4-L5 level 3. Spinal stenosis of lumbar region without neurogenic claudication 4. Osteoporosis, unspecified osteoporosis type, unspecified pathological fracture presence -     DEXA BONE DENSITY STUDY AXIAL; Future Other orders -     DULoxetine (CYMBALTA) 20 mg capsule; Take 1 Cap by mouth daily (with dinner). 1. 84yo w/high fall risk, multiple fx. Chronic distal LE from prior CVA- unrelated to her lumbar stenosis. .  Advised to stay active as tolerated. 2. Recommend walker over cane for stability and prevent falls 3. DEXA scan - eval osteoporosis 4. Trial of Cymbalta 20mg. Discussed side effects. Rare SI.  
5. Continue Tylenol PRN 6. Given information on fall prevention F/U 4 weeks CHIEF COMPLAINT Beryle Heller is seen today in consultation at the request of Dr. Jonathan Guzman for complaints of back pain and R knee pain to foot. HISTORY OF PRESENT ILLNESS Beryle Heller is a 80 y.o. female. She was  last year and moved here from [place], [place] to live with her son. Today pt c/o back pain of May 2019 duration and RLE pain of multiple year duration. Pt has had trauma that caused pain. He reports a fall 2019 to initiate back pain. R leg burns in calf x years. Reviewed notes from ortho in [place]. Had acute/subacute L2 comp fx- kyphoplasty was discussed and deferred. Pt admits to many falls. Denies falls due to pain. Hx CVA. She states her pain has improved since May 2019, but is constant.  Notes benefit with laying, aggravation with sitting, standing. Denies buttock pain. Admits she is tired often. Is uncertain how many CVAs she has had, denies lasting weakness. Admits to worsening memory. Denies having kyphoplasty done. Daughter-in-law notes she injured her R side when she fell reaching for her walker about 1 month ago. Pt notes pain with sitting straight up against the back of the chair. Denies hx of hip sx. Daughter-in-law asks how to get a copy of medical records from [place]. Location of pain: low back ache, R ribcage Does pain radiate into extremities: RLE knee down burning pain. B/L ankle pain. Does patient have weakness: no  
Pt denies saddle paresthesias. Pt reports urinary incontinence-chronic. Medications pt is on: Tylenol PRN with benefit. Ca supplement. Coumadin. Voltaren gel. Denies persistent fevers, chills, weight changes, neurogenic bowel or bladder symptoms. Pt denies recent ED visits or hospitalizations. Treatments patient has tried: 
Physical therapy:Yes home care 2020 Doing HEP: Unknown Non-opioid medications: Yes Failed Gabapentin - SE, unsure if tried Lyrica-noted on previous chart from [place]. Spinal injections: Yes T-PATRICIA L4-5 NC 5/2017 with several month benefit, 10/2017 less benefit Spinal surgery- No.  
Last L CT 6/2019: acute to sub acute fracture 30% L2. listhesis L4-5 with severe stenosis.  reviewed. PMHx of a-fib, CVA, fibromyalgia, GERD, R rotator cuff repair, pacemaker, ? L hip fx.  passed away 9/2019. Recently moved in with son and daughter-in-law from [place] to [place]. Pt has 6 grandchildren, 7 great grandchildren. Pain Assessment  3/9/2020 Location of Pain Back;Leg  
Pain Location Comment lumbar Location Modifiers Right Severity of Pain 8 Quality of Pain Burning; Other (Comment) Quality of Pain Comment numbness & weakness Duration of Pain Persistent Frequency of Pain Constant Aggravating Factors Bending;Standing;Walking Limiting Behavior Yes Relieving Factors Heat Result of Injury Yes Type of Injury Fall Type of Injury Comment 2019 CT lumbar spine 6/2019 Result Impression IMPRESSION:  
 
OSTEOPENIA WITH AN ACUTE TO SUBACUTE APPEARING L2 SUPERIOR 
ENDPLATE COMPRESSION FRACTURE DEMONSTRATING APPROXIMATELY 30% HEIGHT LOSS BUT NO RETROPULSION OR CANAL COMPROMISE AS DETAILED 
ABOVE. CHRONIC MORE PRONOUNCED SUPERIOR ENDPLATE DEFORMITY OF L1 WITH 
RETROPULSION CAUSING MILD TO MODERATE CENTRAL CANAL STENOSIS, 
SIMILAR TO EXAMINATION IN 2017. ADVANCED MULTILEVEL SPONDYLOSIS AND FACET ARTHROPATHY WITH GRADE 
1 ANTEROLISTHESIS OF L4 ON L5 AND RESULTANT SEVERE CENTRAL CANAL 
STENOSIS AT L3-L4 AND L4-L5. PAST MEDICAL HISTORY Past Medical History:  
Diagnosis Date  Bradycardia  Carotid artery stenosis  Chronic atrial fibrillation  CVA (cerebral vascular accident) ([facility] [address]  DDD (degenerative disc disease), cervical   
 Essential hypertension  Fibromyalgia  GERD (gastroesophageal reflux disease)  H/O burning pain in leg   
 right leg, current  Macular degeneration   
 right eye  Mitral valve disorder  Osteopenia  Spinal stenosis  TIA (transient ischemic attack) Past Surgical History:  
Procedure Laterality Date  HX CATARACT REMOVAL Bilateral   
 HX COLOSTOMY  2006  
 perfortated colon  HX GYN    
 HX HYSTERECTOMY  HX ORTHOPAEDIC    
 left ankle, ORIF, pins placed and taken out  HX PACEMAKER    
 cardiac pacemaker  HX PACEMAKER    
 HX ROTATOR CUFF REPAIR Right MEDICATIONS Current Outpatient Medications Medication Sig Dispense Refill  DULoxetine (CYMBALTA) 20 mg capsule Take 1 Cap by mouth daily (with dinner). 30 Cap 1  
 tolterodine ER (DETROL-LA) 2 mg ER capsule Take 1 capsule by mouth once daily 30 Cap 0  
 Omeprazole delayed release (PRILOSEC D/R) 20 mg tablet Take 40 mg by mouth daily. Indications: heartburn  CALCIUM PO Take 600 mg by mouth daily.  glucosamine HCl/chondroitin ordaz (GLUCOSAMINE-CHONDROITIN PO) Take  by mouth.  acetaminophen (TYLENOL) 325 mg tablet Take 325 mg by mouth every six (6) hours as needed for Pain.  warfarin (COUMADIN) 2.5 mg tablet Take 1 Tab by mouth daily. Take one tab Monday-Wednesday along with 5 mg . Total 7.5 mg Monday- Wednesday. . Other days she will be on 5 mg (Patient taking differently: Take 2.5 mg by mouth daily. Take one tab Monday-Wednesday and Friday along with 5 mg . Total 7.5 mg Monday- Wednesday. . Other days she will be on 5 mg  Indications: prevention for a blood clot going to the brain) 48 Tab 0  
 warfarin (COUMADIN) 5 mg tablet Take 1 Tab by mouth daily. Take Monday-Wednesday 7.5 mg and rest of the week 5 mg. (Patient taking differently: Take 1 Tab by mouth daily. Take Monday-Wednesday 7.5 mg and rest of the week 5 mg. Indications: prevention for a blood clot going to the brain) 90 Tab 2  
 diclofenac (VOLTAREN) 1 % gel Apply 4 g to affected area daily. Indications: joint damage causing pain and loss of function  multivitamin (ONE A DAY) tablet Take 1 Tab by mouth daily. Indications: vitamin deficiency  cholecalciferol, vitamin D3, (VITAMIN D3 PO) Take 1,000 mg by mouth daily.  OTHER Take 1 Caplet by mouth daily. OTC vision supplement [facility] 50+ vitamin for Vision  Indications: blurred vision ALLERGIES Allergies Allergen Reactions  Statins-Hmg-Coa Reductase Inhibitors Other (comments) \"Intolerance- Medium reaction\"  Aspirin Other (comments) On blood thinners  Citric Acid Other (comments) Patient is not sure of reaction  Codeine Other (comments)  Gabapentin Other (comments) SOCIAL HISTORY Social History Socioeconomic History  Marital status:  Spouse name: Not on file  Number of children: Not on file  Years of education: Not on file  Highest education level: Not on file Occupational History  Not on file Social Needs  Financial resource strain: Not on file  Food insecurity:  
  Worry: Not on file Inability: Not on file  Transportation needs:  
  Medical: Not on file Non-medical: Not on file Tobacco Use  Smoking status: Never Smoker  Smokeless tobacco: Never Used Substance and Sexual Activity  Alcohol use: Never Frequency: Never  Drug use: Never  Sexual activity: Not Currently Lifestyle  Physical activity:  
  Days per week: Not on file Minutes per session: Not on file  Stress: Not on file Relationships  Social connections:  
  Talks on phone: Not on file Gets together: Not on file Attends Scientologist service: Not on file Active member of club or organization: Not on file Attends meetings of clubs or organizations: Not on file Relationship status: Not on file  Intimate partner violence:  
  Fear of current or ex partner: Not on file Emotionally abused: Not on file Physically abused: Not on file Forced sexual activity: Not on file Other Topics Concern  Not on file Social History Narrative  Not on file FAMILY HISTORY Family History Problem Relation Age of Onset  Stroke Mother  Heart Failure Father REVIEW OF SYSTEMS Review of Systems Constitutional: Negative for chills, fever and weight loss. Respiratory: Negative for shortness of breath. Cardiovascular: Negative for chest pain. Gastrointestinal: Negative for constipation. Negative for fecal incontinence Genitourinary: Negative for dysuria. Negative for urinary incontinence Musculoskeletal: Positive for falls. Per HPI Skin: Negative for rash. Neurological: Positive for tingling and focal weakness. Negative for dizziness, tremors and headaches. Endo/Heme/Allergies: Does not bruise/bleed easily. Psychiatric/Behavioral: Positive for memory loss. The patient does not have insomnia. PHYSICAL EXAMINATION Visit Vitals /69 (BP 1 Location: Left arm, BP Patient Position: Sitting) Pulse 74 Temp 97.5 °F (36.4 °C) (Oral) Resp 16 Ht 4' 11\" (1.499 m) Wt 126 lb (57.2 kg) SpO2 100% BMI 25.45 kg/m² Accompanied by family member-daughter in law Constitutional:  Elderly, kyphotic Psychiatric: Affect and mood are appropriate. Integumentary: No rashes or abrasions noted on exposed areas. Cardiovascular/Peripheral Vascular: No peripheral edema is noted BLE. SPINE/MUSCULOSKELETAL EXAM 
 
Lumbar spine: No rash, ecchymosis. Elevation of L pelvis compared to R. No fasciculations. No focal atrophy is noted. Tenderness to palpation TL junction, L >R SI joint. No tenderness to palpation at the sciatic notch. Trochanters non tender. MOTOR:   
 Hip Flex Quads Hamstrings Ankle DF EHL Ankle PF Right 4/5 -4/5 4/5 -4/5 4/5 4/5 Left 4/5 4/5 4/5 4/5 4/5 4/5 Straight Leg raise negative. Ambulation with walker. FWB. Kyphotic. Written by Ann Ng, as dictated by Teo Hinton MD. 
 
I, Dr. Teo Hinton MD, confirm that all documentation is accurate. Ms. Shagufta Farias may have a reminder for a \"due or due soon\" health maintenance. I have asked that she contact her primary care provider for follow-up on this health maintenance.

## 2020-03-09 NOTE — PATIENT INSTRUCTIONS
Preventing Falls: Care Instructions Your Care Instructions Getting around your home safely can be a challenge if you have injuries or health problems that make it easy for you to fall. Loose rugs and furniture in walkways are among the dangers for many older people who have problems walking or who have poor eyesight. People who have conditions such as arthritis, osteoporosis, or dementia also have to be careful not to fall. You can make your home safer with a few simple measures. Follow-up care is a key part of your treatment and safety. Be sure to make and go to all appointments, and call your doctor if you are having problems. It's also a good idea to know your test results and keep a list of the medicines you take. How can you care for yourself at home? Taking care of yourself · You may get dizzy if you do not drink enough water. To prevent dehydration, drink plenty of fluids, enough so that your urine is light yellow or clear like water. Choose water and other caffeine-free clear liquids. If you have kidney, heart, or liver disease and have to limit fluids, talk with your doctor before you increase the amount of fluids you drink. · Exercise regularly to improve your strength, muscle tone, and balance. Walk if you can. Swimming may be a good choice if you cannot walk easily. · Have your vision and hearing checked each year or any time you notice a change. If you have trouble seeing and hearing, you might not be able to avoid objects and could lose your balance. · Know the side effects of the medicines you take. Ask your doctor or pharmacist whether the medicines you take can affect your balance. Sleeping pills or sedatives can affect your balance. · Limit the amount of alcohol you drink. Alcohol can impair your balance and other senses. · Ask your doctor whether calluses or corns on your feet need to be removed.  If you wear loose-fitting shoes because of calluses or corns, you can lose your balance and fall. · Talk to your doctor if you have numbness in your feet. Preventing falls at home · Remove raised doorway thresholds, throw rugs, and clutter. Repair loose carpet or raised areas in the floor. · Move furniture and electrical cords to keep them out of walking paths. · Use nonskid floor wax, and wipe up spills right away, especially on ceramic tile floors. · If you use a walker or cane, put rubber tips on it. If you use crutches, clean the bottoms of them regularly with an abrasive pad, such as steel wool. · Keep your house well lit, especially [place], and outside walkways. Use night-lights in areas such as hallways and bathrooms. Add extra light switches or use remote switches (such as switches that go on or off when you clap your hands) to make it easier to turn lights on if you have to get up during the night. · Install sturdy handrails on stairways. · Move items in your cabinets so that the things you use a lot are on the lower shelves (about waist level). · Keep a cordless phone and a flashlight with new batteries by your bed. If possible, put a phone in each of the main rooms of your house, or carry a cell phone in case you fall and cannot reach a phone. Or, you can wear a device around your neck or wrist. You push a button that sends a signal for help. · Wear low-heeled shoes that fit well and give your feet good support. Use footwear with nonskid soles. Check the heels and soles of your shoes for wear. Repair or replace worn heels or soles. · Do not wear socks without shoes on wood floors. · Walk on the grass when the sidewalks are slippery. If you live in an area that gets snow and ice in the winter, sprinkle salt on slippery steps and sidewalks. Preventing falls in the bath · Install grab bars and nonskid mats inside and outside your shower or tub and near the toilet and sinks. · Use shower chairs and bath benches. · Use a hand-held shower head that will allow you to sit while showering. · Get into a tub or shower by putting the weaker leg in first. Get out of a tub or shower with your strong side first. 
· Repair loose toilet seats and consider installing a raised toilet seat to make getting on and off the toilet easier. · Keep your bathroom door unlocked while you are in the shower. Where can you learn more? Go to http://pedro-abiodun.info/. Enter 0476 [id number] in the search box to learn more about \"Preventing Falls: Care Instructions. \" Current as of: November 7, 2018 Content Version: 12.2 © 2108-8078 [employer]. Care instructions adapted under license by [employer] (which disclaims liability or warranty for this information). If you have questions about a medical condition or this instruction, always ask your healthcare professional. [place] [address] any warranty or liability for your use of this information. Preventing Outdoor Falls: Care Instructions Your Care Instructions Worries about falls don't need to keep you indoors. Outdoor activities like walking have big benefits for your health. You will need to watch your step and learn a few safety measures. If you are worried about having a fall outdoors, ask your doctor about exercises, classes, or physical therapy that may help. You can learn ways to gain strength, flexibility, and balance. Ask if it might help to use a cane or walker. You can make your time outdoors safer with a few simple measures. Follow-up care is a key part of your treatment and safety. Be sure to make and go to all appointments, and call your doctor if you are having problems. It's also a good idea to know your test results and keep a list of the medicines you take. How can you prevent falls outdoors? · Wear shoes with firm soles and low heels.  If you have to walk on an icy surface, use grippers that can be worn over your shoes in bad weather. · Be extra careful if weather is bad. Walk on the grass when the sidewalks are slick. If you live in a place that gets snow and ice in the winter, sprinkle salt on slippery stairs and sidewalks. · Be careful getting on or off buses and trains or getting in and out of cars. If handrails are available, use them. · Be careful when you cross the street. Look for crosswalks or places where curb cuts or ramps are present. · Try not to hurry, especially if you are carrying something. · Be cautious in parking lots or garages. There may be curbs or changes in pavement, or the height of the pavement may vary. · Make sure to wear the correct eyeglasses, if you need them. Reading glasses or bifocals can make it harder to see hazards that might be in your way. · If you are walking outdoors for exercise, try to: 
? Walk in well-lighted, well-maintained areas. These include high school or college tracks, shopping malls, and public spaces. ? Walk with a partner. ? Watch out for cracked sidewalks, curbs, changes in the height of the pavement, exposed tree roots, and debris such as fallen leaves or branches. Where can you learn more? Go to http://pedro-abiodun.info/. Enter A770 in the search box to learn more about \"Preventing Outdoor Falls: Care Instructions. \" Current as of: November 7, 2018 Content Version: 12.2 © 5324-0780 [employer]. Care instructions adapted under license by [employer] (which disclaims liability or warranty for this information). If you have questions about a medical condition or this instruction, always ask your healthcare professional. [address] any warranty or liability for your use of this information.

## 2020-03-09 NOTE — PROGRESS NOTES
Verbal order entered per Dr. Orville Valentino as documented on blue sheet:Dexa scan due to Osteoporosis. Cymbalta 20mg take 1 tab po with dinner. Disp 30 with 1 refill

## 2020-03-12 NOTE — PROGRESS NOTES
Dat Brennan presents today for Chief Complaint Patient presents with  Irregular Heart Beat  
  3 month follow up - no cardiac complaints Dat Brennan preferred language for health care discussion is english/other. Is someone accompanying this pt? daughter Is the patient using any DME equipment during OV? Gale Miranda Depression Screening: 
3 most recent PHQ Screens 3/12/2020 Little interest or pleasure in doing things Not at all Feeling down, depressed, irritable, or hopeless Not at all Total Score PHQ 2 0 Learning Assessment: 
Learning Assessment 3/12/2020 PRIMARY LEARNER Patient HIGHEST LEVEL OF EDUCATION - PRIMARY LEARNER  -  
BARRIERS PRIMARY LEARNER -  
[id number] [place] [address] NAME -  
CO-LEARNER HIGHEST LEVEL OF EDUCATION -  
Anny Steinberg 10 -  
PRIMARY LANGUAGE ENGLISH  
PRIMARY LANGUAGE CO-LEARNER -  
 NEED -  
LEARNER PREFERENCE PRIMARY DEMONSTRATION  
  -  
LEARNER PREFERENCE [address] [place] [address] -  
ANSWERED BY Patient RELATIONSHIP SELF Abuse Screening: 
Abuse Screening Questionnaire 3/12/2020 Do you ever feel afraid of your partner? Sam Harrell Are you in a relationship with someone who physically or mentally threatens you? Sam Harrell Is it safe for you to go home? Edward Castañeda Fall Risk Fall Risk Assessment, last 12 mths 3/12/2020 Able to walk? Yes Fall in past 12 months? No  
Fall with injury? -  
Number of falls in past 12 months - Fall Risk Score - Pt currently taking Anticoagulant therapy? Warfarin Coordination of Care: 1. Have you been to the ER, urgent care clinic since your last visit? Hospitalized since your last visit? no 
 
2. Have you seen or consulted any other health care providers outside of the [address] [place] [address] since your last visit? Include any pap smears or colon screening.  no

## 2020-03-12 NOTE — PROGRESS NOTES
HPI: An 80-year old female here for follow up. She is accompanied by her daughter-in-law as usual. She has been doing well. She has had no chest pain, dyspnea, orthopnea, PND or edema. She usually gets around with a walker. Her  passed away a little over a year ago and she moved to the area and lives with her son and daughter-in-law. They are concerned about some mild to moderate memory loss. Impression/Plan: 1. Single chamber [employer] pacemaker approaching VOLODYMYR in three to six months, but not yet triggered. She is paced approximately 45%. 2. Chronic atrial fibrillation on coumadin. 3. History of echocardiogram historically with normal function. 4. History of fibromyalgia. 5. History of dyslipidemia. 6. Remote TIA. 7. Gastroesophageal reflux disease with history of diverticulitis and colectomy. 8. Mild dementia. 9. Poor functional status and limited mobility usually using a walker. She did have some falls and had home health see her. She seemed to get better but she still is prone to unsteadiness. She has no bleeding issues. Her pacemaker is functioning normally and I will check it in about three months as I suspect it will be approaching VOLODYMYR. She did have some pain last time with the generator changeout and I talked to her about it being an outpatient procedure. Past Medical History:  
Diagnosis Date  Bradycardia  Carotid artery stenosis  Chronic atrial fibrillation  CVA (cerebral vascular accident) ([address]  DDD (degenerative disc disease), cervical   
 Essential hypertension  Fibromyalgia  GERD (gastroesophageal reflux disease)  H/O burning pain in leg   
 right leg, current  Macular degeneration   
 right eye  Mitral valve disorder  Osteopenia  Spinal stenosis  TIA (transient ischemic attack) Current Outpatient Medications Medication Sig Dispense Refill  DULoxetine (CYMBALTA) 20 mg capsule Take 1 Cap by mouth daily (with dinner). 30 Cap 1  
 tolterodine ER (DETROL-LA) 2 mg ER capsule Take 1 capsule by mouth once daily 30 Cap 0  
 Omeprazole delayed release (PRILOSEC D/R) 20 mg tablet Take 40 mg by mouth daily. Indications: heartburn  CALCIUM PO Take 600 mg by mouth daily.  glucosamine HCl/chondroitin ordaz (GLUCOSAMINE-CHONDROITIN PO) Take  by mouth.  acetaminophen (TYLENOL) 325 mg tablet Take 325 mg by mouth every six (6) hours as needed for Pain.  warfarin (COUMADIN) 2.5 mg tablet Take 1 Tab by mouth daily. Take one tab Monday-Wednesday along with 5 mg . Total 7.5 mg Monday- Wednesday. . Other days she will be on 5 mg (Patient taking differently: Take 2.5 mg by mouth daily. Take one tab Monday-Wednesday and Friday along with 5 mg . Total 7.5 mg Monday- Wednesday. . Other days she will be on 5 mg  Indications: prevention for a blood clot going to the brain) 48 Tab 0  
 warfarin (COUMADIN) 5 mg tablet Take 1 Tab by mouth daily. Take Monday-Wednesday 7.5 mg and rest of the week 5 mg. (Patient taking differently: Take 1 Tab by mouth daily. Take Monday-Wednesday 7.5 mg and rest of the week 5 mg. Indications: prevention for a blood clot going to the brain) 90 Tab 2  
 diclofenac (VOLTAREN) 1 % gel Apply 4 g to affected area daily. Indications: joint damage causing pain and loss of function  multivitamin (ONE A DAY) tablet Take 1 Tab by mouth daily. Indications: vitamin deficiency  cholecalciferol, vitamin D3, (VITAMIN D3 PO) Take 1,000 mg by mouth daily.  OTHER Take 1 Caplet by mouth daily. OTC vision supplement [facility] vitamin for Vision  Indications: blurred vision Social History 
 reports that she has never smoked. She has never used smokeless tobacco. 
 reports no history of alcohol use. Family History 
family history includes Heart Failure in her father; Stroke in her mother. Review of Systems Except as stated above include: 
Constitutional: Negative for fever, chills and malaise/fatigue. HEENT: No congestion or recent URI. Gastrointestinal: No nausea, vomiting, abdominal pain, bloody stools. Pulmonary:  Negative except as stated above. Cardiac:  Negative except as stated above. Musculoskeletal: Negative except as stated above. Neurological:  No localized symptoms. Skin:  Negative except as stated above. Psych:  Negative except as stated above. Endocrine:  Negative except as stated above. PHYSICAL EXAM 
BP Readings from Last 3 Encounters:  
03/12/20 110/70  
03/09/20 134/69  
02/26/20 130/63 Pulse Readings from Last 3 Encounters:  
03/12/20 74  
03/09/20 74  
02/26/20 78 Wt Readings from Last 3 Encounters:  
03/12/20 57.2 kg (126 lb) 03/09/20 57.2 kg (126 lb)  
02/24/20 58.5 kg (129 lb) General:   Well developed, well groomed. Head/Neck:   No jugular venous distention No carotid bruits. No evidence of xanthelasma. Lungs:   No respiratory distress. Clear bilaterally. Heart:    Regular rate and rhythm. Normal S1/S2. Palpation of heart with normal point of maximum impulse. No significant murmurs, rubs or gallops. Pacer pocket intact. Abdomen:   Soft and nontender. No palpable abdominal mass or bruits. Extremities:   Intact peripheral pulses. No significant edema. Neurological:   Alert and oriented to person, place, time. No focal neurological deficit visually. Skin:   No obvious rash Blood Pressure Metric: 
Monitor recommended and adjustments stated if needed.

## 2020-04-01 NOTE — PROGRESS NOTES
I have personally seen and evaluated the device findings. Interrogation reviewed and I agree with assessment.     Pilar oBoth

## 2020-04-02 NOTE — PROGRESS NOTES
Verbal order and read back per Teena Louis NP The INR is stable and therapeutic. Continue same dose of coumadin and recheck in 2 weeks Coumadin 5mg daily except 7.5mg Mon-Wed-Fri. Recheck 2 week. Patient's daughter-in-law made aware of dosing and recheck instructions, no questions.

## 2020-04-06 NOTE — TELEPHONE ENCOUNTER
The bone density will have to wait until the pandemic is over. She was not on the cymbalta long enough to determine effectiveness as it can take 4-6 weeks. If she does not have glaucoma or h/o kidney stones, then we can try topamax at a low dose.

## 2020-04-06 NOTE — TELEPHONE ENCOUNTER
Per daughter in law mikael zhou:     states patient was hallucinating while on cymbalta and they chose to wean her off of it--last taken 4/4/2020. She further commented it never gave her any pain relief.     Bone density test has been cxd due to covid19    Please advise    Daughter in law, arabella zhou at Z#[phone]

## 2020-04-10 NOTE — TELEPHONE ENCOUNTER
Spoke with Luis Miner, and they would like to hold off on trying a new medication until being seen again by Dr. Celestine Gamez. She states the pts pain in not very bad at the moment. Advised to call back if symptoms change.

## 2020-04-17 NOTE — PROGRESS NOTES
Verbal order and read back per Jamil Ba MD   Coumadin 5mg daily except 7.5mg Mon-Wed-Fri. Recheck 2 week.     Left message

## 2020-05-04 NOTE — PROGRESS NOTES
Verbal order and read back per Jose David Taylor NP  The INR is below the therapeutic range. Please make the following adjustments to Coumadin dosing:  Coumadin 5mg daily except 7.5mg Mon-Wed-Fri. Recheck 2 weeks.

## 2020-06-05 NOTE — PROGRESS NOTES
Verbal order and read back per Santi Echevarria MD  INR below therapeutic range 1.9 - 6/4/2020  Coumadin 5mg daily except 7.5mg Mon-Wed-Fri. No greens for a few days. Recheck 2 week. Patient made aware of dosing and recheck instructions, no questions.

## 2020-06-15 NOTE — PROGRESS NOTES
[facility] [address] [facility] [address] [place], [address] Office:  [phone]  Fax: [phone] Initial Office Exam 
Patient Name: Clemente Vergara Age: 80 y.o. Gender: female Handedness: left handed Presenting Concern: cognitive complaints Primary Care Physician/Referring Provider: Phan Daly MD 
 
 
REASON FOR REFERRAL: 
This comprehensive and medically necessary neuropsychological assessment was requested to assist a differential diagnosis of cognitive complaints. The use and purpose of this examination, as well as the extent and limitations of confidentiality, were explained prior to obtaining permission to participate. Instructions were provided regarding the necessity to put forth optimal effort and answer questions truthfully in order to obtain reliable and accurate test results. REVIEW OF RECORDS: 
Ms. Ila Ziegler was referred by her PCP for memory loss. A history of depression and fibromyalgia are noted. Current Outpatient Medications Medication Sig  tolterodine ER (DETROL-LA) 2 mg ER capsule Take 1 capsule by mouth once daily  DULoxetine (CYMBALTA) 20 mg capsule Take 1 Cap by mouth daily (with dinner).  Omeprazole delayed release (PRILOSEC D/R) 20 mg tablet Take 40 mg by mouth daily. Indications: heartburn  CALCIUM PO Take 600 mg by mouth daily.  glucosamine HCl/chondroitin ordaz (GLUCOSAMINE-CHONDROITIN PO) Take  by mouth.  acetaminophen (TYLENOL) 325 mg tablet Take 325 mg by mouth every six (6) hours as needed for Pain.  warfarin (COUMADIN) 2.5 mg tablet Take 1 Tab by mouth daily. Take one tab Monday-Wednesday along with 5 mg . Total 7.5 mg Monday- Wednesday. . Other days she will be on 5 mg (Patient taking differently: Take 2.5 mg by mouth daily. Take one tab Monday-Wednesday and Friday along with 5 mg . Total 7.5 mg Monday- Wednesday.   . Other days she will be on 5 mg Indications: prevention for a blood clot going to the brain)  warfarin (COUMADIN) 5 mg tablet Take 1 Tab by mouth daily. Take Monday-Wednesday 7.5 mg and rest of the week 5 mg. (Patient taking differently: Take 1 Tab by mouth daily. Take Monday-Wednesday 7.5 mg and rest of the week 5 mg. Indications: prevention for a blood clot going to the brain)  diclofenac (VOLTAREN) 1 % gel Apply 4 g to affected area daily. Indications: joint damage causing pain and loss of function  multivitamin (ONE A DAY) tablet Take 1 Tab by mouth daily. Indications: vitamin deficiency  cholecalciferol, vitamin D3, (VITAMIN D3 PO) Take 1,000 mg by mouth daily.  OTHER Take 1 Caplet by mouth daily. OTC vision supplement [employer] 50+ vitamin for Vision  Indications: blurred vision No current facility-administered medications for this visit. Past Medical History:  
Diagnosis Date  Bradycardia  Carotid artery stenosis  Chronic atrial fibrillation  CVA (cerebral vascular accident) ([facility] [address]  DDD (degenerative disc disease), cervical   
 Essential hypertension  Fibromyalgia  GERD (gastroesophageal reflux disease)  H/O burning pain in leg   
 right leg, current  Macular degeneration   
 right eye  Mitral valve disorder  Osteopenia  Spinal stenosis  TIA (transient ischemic attack) Past Surgical History:  
Procedure Laterality Date  HX CATARACT REMOVAL Bilateral   
 HX COLOSTOMY  2006  
 perfortated colon  HX GYN    
 HX HYSTERECTOMY  HX ORTHOPAEDIC    
 left ankle, ORIF, pins placed and taken out  HX PACEMAKER    
 cardiac pacemaker  HX PACEMAKER    
 HX ROTATOR CUFF REPAIR Right CLINICAL INTERVIEW: 
Ms. Lon Bernal arrived for her scheduled appointment accompanied by her daughter-in-law, Marimar Rodriguez, who participated in the clinical interview.  Consistent with records, they reported problems with memory, learning, word-finding, comprehension, and executive function which have been worseing over the previous three years. Neurological history is negative for seizures, syncope and head trauma but positive for strokes (records and imaging not available for review). Records provided by family indicate a diagnosis of dementia. Ms. Doss Ace previous PCP in [place] reportedly instructed the family not to leave her unattended for more than 30 minutes. Sleep disturbance includes difficulty with sleep onset and daytime napping; snoring was denied. Pain complaints include residual back and leg pain secondary to stroke. With regard to emotional functioning, Celexa was reportedly discontinued due to psychotic symptoms. Socially, Ms. Vivek Wright is  as of . However, throughout the interview, she indicated that her   last week. Family also reported that she is tends to not recognize her daughter-in-law, accusing her of \"shacking up with her son\". She also accuses family of stealing things that she has misplaced. Nonpharmacological interventions for these behaviors were discussed. Following the death of her , Ms. Vivek Wright relocated to [place] and now resides with her son and daughter-in-law. Hobbies are limited due to cognitive decline. Academically, Ms. Cooney completed 12 years of education and denied a history of LD and ADHD. She is retired from administrative positions. Functionally, Ms. Vivek Wright is dependent on family for medication management and bill payment. Her son, Jimbo Mina has financial and medical POA. When asked who manages her fiances, Ms. Kim replied, \"I don't know\". Although she no longer holds a 's license, Ms. Vivek Wright indicated that she still drives occasionally and is, \"the best  in town\". MENTAL STATUS: 
 
Sensorium  Awake, Aware, Alert Orientation person and situation Relations cooperative Eye Contact appropriate Appearance:  age appropriate Motor Behavior:  gait unsteady Speech:  normal pitch and normal volume Vocabulary average Thought Process: disorganized Thought Content free of delusions and free of hallucinations Suicidal ideations none Homicidal ideations none Mood:  euthymic Affect:  mood-congruent Memory recent  impaired Memory remote:  impaired Concentration:  impaired Abstraction:  concrete Insight:  limited Reliability poor Judgment:  fair DIAGNOSTIC IMPRESSIONS: 
1. Cognitive Decline: R/O Major Neurocognitive Disorder 2. Symptoms of Depression 3. Anxiety about health PLAN: 
1. Complete a comprehensive neuropsychological assessment to provide a differential diagnosis of presenting concerns as well as to assist with disposition and treatment planning as appropriate. 2. Consider compensatory and remedial cognitive training. 3. Consider nonpharmacological interventions for mood disorder. 4. Consider an adaptive driving evaluation. 5. Consider referral for elder health nurse to provide an in-home functional assessment. 6. Consider placement issues to provide greater structure and supervision to ensure safety, health and well-being. 35346 x 1 Review of records. Face to face interview w/ patient. Determine test protocol: 60 minutes. Total 1 unit Quinn Oseguera, PHD 
Licensed Clinical Psychologist 
 
This note was created using voice recognition software. Despite editing, there may be syntax errors. This note will not be viewable in [address]

## 2020-06-19 NOTE — PROGRESS NOTES
Verbal order and read back per Loretta Donaldson, DO  INR below therapeutic range 1.9 - 6/18/2020  Coumadin 10mg today, then continue Coumadin 5mg daily except 7.5mg Mon-Wed-Fri. No greens for a few days. Recheck 2 weeks. Patient's family made aware of dosing and recheck instructions, no questions.

## 2020-07-08 NOTE — PROGRESS NOTES
[facility] [address] [place], [address] Office:  [phone]  Fax: [phone] Neuropsychological Evaluation Report Patient Name: Emma Piña Age: 80 y.o. Gender: female Handedness: left handed Presenting Concern: cognitive complaints Primary Care Physician/Referring Provider: Coral Stauffer MD 
 
PATIENT HISTORY (OBTAINED DURING INITIAL CLINICAL EVALUATION): 
 
REASON FOR REFERRAL: 
This comprehensive and medically necessary neuropsychological assessment was requested to assist a differential diagnosis of cognitive complaints. The use and purpose of this examination, as well as the extent and limitations of confidentiality, were explained prior to obtaining permission to participate. Instructions were provided regarding the necessity to put forth optimal effort and answer questions truthfully in order to obtain reliable and accurate test results. REVIEW OF RECORDS: 
Ms. Gabbie Massey was referred by her PCP for memory loss. A history of depression and fibromyalgia are noted. Current Outpatient Medications Medication Sig  tolterodine ER (DETROL-LA) 2 mg ER capsule Take 1 capsule by mouth once daily  DULoxetine (CYMBALTA) 20 mg capsule Take 1 Cap by mouth daily (with dinner).  Omeprazole delayed release (PRILOSEC D/R) 20 mg tablet Take 40 mg by mouth daily. Indications: heartburn  CALCIUM PO Take 600 mg by mouth daily.  glucosamine HCl/chondroitin ordaz (GLUCOSAMINE-CHONDROITIN PO) Take  by mouth.  acetaminophen (TYLENOL) 325 mg tablet Take 325 mg by mouth every six (6) hours as needed for Pain.  warfarin (COUMADIN) 2.5 mg tablet Take 1 Tab by mouth daily. Take one tab Monday-Wednesday along with 5 mg . Total 7.5 mg Monday- Wednesday. . Other days she will be on 5 mg (Patient taking differently: Take 2.5 mg by mouth daily. Take one tab Monday-Wednesday and Friday along with 5 mg . Total 7.5 mg Monday- Wednesday. . Other days she will be on 5 mg  Indications: prevention for a blood clot going to the brain)  warfarin (COUMADIN) 5 mg tablet Take 1 Tab by mouth daily. Take Monday-Wednesday 7.5 mg and rest of the week 5 mg. (Patient taking differently: Take 1 Tab by mouth daily. Take Monday-Wednesday 7.5 mg and rest of the week 5 mg. Indications: prevention for a blood clot going to the brain)  diclofenac (VOLTAREN) 1 % gel Apply 4 g to affected area daily. Indications: joint damage causing pain and loss of function  multivitamin (ONE A DAY) tablet Take 1 Tab by mouth daily. Indications: vitamin deficiency  cholecalciferol, vitamin D3, (VITAMIN D3 PO) Take 1,000 mg by mouth daily.  OTHER Take 1 Caplet by mouth daily. OTC vision supplement [employer] 50+ vitamin for Vision  Indications: blurred vision No current facility-administered medications for this visit. Past Medical History:  
Diagnosis Date  Bradycardia  Carotid artery stenosis  Chronic atrial fibrillation  CVA (cerebral vascular accident) ([facility] [address]  DDD (degenerative disc disease), cervical   
 Essential hypertension  Fibromyalgia  GERD (gastroesophageal reflux disease)  H/O burning pain in leg   
 right leg, current  Macular degeneration   
 right eye  Mitral valve disorder  Osteopenia  Spinal stenosis  TIA (transient ischemic attack) Past Surgical History:  
Procedure Laterality Date  HX CATARACT REMOVAL Bilateral   
 HX COLOSTOMY  2006  
 perfortated colon  HX GYN    
 HX HYSTERECTOMY  HX ORTHOPAEDIC    
 left ankle, ORIF, pins placed and taken out  HX PACEMAKER    
 cardiac pacemaker  HX PACEMAKER    
 HX ROTATOR CUFF REPAIR Right CLINICAL INTERVIEW: 
Ms. Makayla Day arrived for her scheduled appointment accompanied by her daughter-in-law, Kandace Joyner, who participated in the clinical interview. Consistent with records, they reported problems with memory, learning, word-finding, comprehension, and executive function which have been worseing over the previous three years. Neurological history is negative for seizures, syncope and head trauma but positive for strokes (records and imaging not available for review). Records provided by family indicate a diagnosis of dementia. Ms. Mauricio Salgado previous PCP in [place] reportedly instructed the family not to leave her unattended for more than 30 minutes. Sleep disturbance includes difficulty with sleep onset and daytime napping; snoring was denied. Pain complaints include residual back and leg pain secondary to stroke. With regard to emotional functioning, Celexa was reportedly discontinued due to psychotic symptoms. Socially, Ms. Geno Navarro is  as of . However, throughout the interview, she indicated that her   last week. Family also reported that she tends to not recognize her daughter-in-law, accusing her of \"shacking up with her son\". She also accuses family of stealing things that she has misplaced. Nonpharmacological interventions for these behaviors were discussed. Following the death of her , Ms. Geno Navarro relocated to [place] and now resides with her son and daughter-in-law. Hobbies are limited due to cognitive decline. Academically, Ms. Geno Navarro completed 12 years of education and denied a history of LD and ADHD. She is retired from administrative positions. Functionally, Ms. Geno Navarro is dependent on family for medication management and bill payment. Her son, Hattie Cuadra has financial and medical POA. When asked who manages her fiances, Ms. Kim replied, \"I don't know\". Although she no longer holds a 's license, Ms. Geno Navarro indicated that she still drives occasionally and is, \"the best  in town\". MENTAL STATUS: 
 
Sensorium  Awake, Aware, Alert Orientation person and situation Relations cooperative Eye Contact appropriate Appearance:  age appropriate Motor Behavior:  gait unsteady Speech:  normal pitch and normal volume Vocabulary average Thought Process: disorganized Thought Content free of delusions and free of hallucinations Suicidal ideations none Homicidal ideations none Mood:  euthymic Affect:  mood-congruent Memory recent  impaired Memory remote:  impaired Concentration:  impaired Abstraction:  concrete Insight:  limited Reliability poor Judgment:  fair METHODS OF ASSESSMENT (Current Evaluation): 
Clinician Administered: Adaptive Behavior Assessment System (ABAS-3) Clinical Assessment of Geriatric Emotional Status (CASE) Clock Drawing Task Geriatric Depression Scale: Short Form (GDS) Modified Mini-Mental Status Exam (3MS) Test of Premorbid Functioning (TOPF) Technician Administered: 
George Dementia Rating Scale-2 Neuropsychological Assessment Battery-Select Subtests Trailmaking Test 
 
TEST OBSERVATIONS: 
Ms. Ramu Austin arrived promptly for the testing session. Dress and grooming were appropriate; physical presentation was unchanged from that observed during the clinical interview. Speech was fluent and coherent with normal rate, rhythm, tone, and volume. Comprehension difficulties were noted for simple and complex instructions. No unusual behaviors or psychomotor abnormalities were observed. Thought process was confused and inattentive, requiring redirection. Thought content showed no apparent delusional ideation. Auditory and visual hallucinations were denied and there was no obvious response to internal stimuli. Affect was congruent with the anxious mood conveyed. Ms. Ramu Ausitn was adequately cooperative and appeared to put forth good effort throughout this examination. Rapport with the examiner was adequately established and maintained.  Given the above observations, plus comments contained in the Mental Status section, the results of this examination are regarded as reasonably reliable and valid. TEST RESULTS: 
Quantitative test results are derived from comparisons to age and education corrected cohort normative data, where applicable. Percentiles are included in these instances. Qualitative test results are determined using clinical observations. Sensory-Perceptual and Motor Functioning:   
Visual and auditory acuity:  Glasses Gait and balance:   Ambulated by John Cristina Cognitive Screening: On the Modified Mini-Mental Status Exam, Ms. Andreina Shaver obtained a severely impaired score. Difficulties were noted in the following areas: Mental reversal, immediate/delayed spontaneous and cued recall, orientation (unable to identify the year, season, month, date, day of the week, state, and city), confrontational naming, verbal fluency, abstract reasoning, figure copy, and following a 3stage command. Clock drawing was significant for missing numbers, misalignment of numbers, and missing clock hands. Ms. Andreina Shaver was also unable to provide her home address or identify who she lives with. Attention/Concentration:      
Simple visuomotor tracking (1 percentile)                    Severely Impaired Visuospatial and Constructional Praxis:    
Visual discrimination (14 percentile)                               Low Average Language: Auditory comprehension (7 percentile)                    Mildly Impaired Naming (1 percentile)                                                                             Severely Impaired Cognitive Tests of Executive Functioning:    
Ability to think flexibly, Trailmaking B (<1 percentile)                  Severely Impaired Simple judgment in daily decision making (<1 percentile)                 Severely Impaired Dementia Rating Scale -2 Scale:     Age-Corrected MOANS Scaled Score  Percentile Attention    8       19-28 Initiation and Preservation  2       <1 Construction    10       44-02 Conceptualization   2       <1 Memory    2       <1 Total Score     2       <1 Intellectual and Basic Cognitive Functioning: 
ACS Test of pre-morbid functioning reading recognition lower limit estimated WAIS-IV FSIQ score: 
    Estimated full scale IQ: 95   Percentile: 37     Rating: Average Adaptive Behavior Measure for Independent Living (NAB-Daily Living): 
Daily living memory immediate recall (<1 percentile)    Severely Impaired Daily living memory delayed recall (<1 percentile)               Severely Impaired Adaptive Behavior (Adaptive Behavior Assessment System) General Adaptive Composite:  72   Percentile: 3 Conceptual:  65    Percentile: 1 Social:  68    Percentile: 6 Practical:  75    Percentile: 5 Emotional Functioning: 
 
Screening of Emotional/Psychiatric Status: 
Level of self-reported depression   (6/15)  Mild On a measure of emotional functioning completed by Ms. Edu Melgar daughter-in-law, she reported observing the following: Cognitive deficits, repetitive thought and behavior, paranoia, disorganized thought, and anxiety with somatic features. IMPRESSIONS/RECOMMENDATIONS: 
Overall impressions are consistent with a severe, mixed dementia with behavioral disturbance (paranoid, anxious, and depressive features). Unfortunately, cognitive deficiencies are diffuse, affecting attention and concentration, language abilities, memory, and executive functions. Visuospatial abilities appear intact. Unsurprisingly, there is a significant degree of functional impairment associated with Ms. Kim's dementia as evidenced by performancebased measures and selfreport measures completed by family. Fortunately, Ms. Olya Huber resides with family, receives oversight for medication management and financial matters, and has POA designations.  With regard to the latter, it should be noted that  from a neuropsychological standpoint, Ms. Kelly Aguirre is lacking the cognitive capacity to make medical decisions and financial decisions, vote, , or to own a firearm. Medication for memory may be beneficial but I do expect this Ms. Kim's cognition to continue deteriorating and her need for supervision and assistance to continue increasing. Unfortunately, there do appear to be neuropsychiatric features that will likely benefit from pharmacotherapy and non-pharmacologic interventions. To obtain more information and education regarding the latter, Ms. Tello Marlow family is encouraged to contact the Alzheimer[employer] Association at: [email or web address] DIAGNOSTIC IMPRESSIONS: 
1. Mixed Dementia, severe, with behavioral disturbance (paranoia, anxiety, dysphoric mood) Thank you for allowing me the opportunity to assist you in Ms. Kim's care. Please do not hesitate to contact me should you have additional questions that I may not have addressed. 96136 x 1 
96137 x 1 
96138 x 1 
96139 x 4 
96132 x 1 
96133 x 1 Thu Crane. Shari Peabody, Ph.D. 
Licensed Clinical Psychologist 
 
 
 
Time Documentation: 
  
59904 x 1 Neuropsych testing/data gathering by Neuropsychologist (1 hour) 51673 
  
37262 x 1 
97083 x 4 Test Administration/Data Gathering By Technician: (2.5 hours). 07187 x 1 (first 30 minutes), 96139 x 4 (each additional 30 minutes) 26339 x 1 
96133 x 1 Testing Evaluation Services by Neuropsychologist (1 hour, 50 minutes) 96132 x 1 (first hour), 96133 x 1 (50 minutes) The above includes: Record review. Review of history provided by patient. Review of collaborative information. Testing by Clinician. Review of raw data. Scoring. Report writing of individual tests administered by Clinician.   Integration of individual tests administered by psychometrist with NSE/testing by clinician, review of records/history/collaborative information, case Conceptualization, treatment planning, clinical decision making, report writing, coordination Of Care. Psychometry test codes as time spent by psychometrist administering and scoring neurocognitive/psychological tests under supervision of neuropsychologist.  Integral services including scoring of raw data, data interpretation, case conceptualization, report writing etcetera were initiated after the patient finished testing/raw data collected and was completed on the date the report was signed. This note will not be viewable in [address] This note was created using voice recognition software. Despite editing, there may be syntax errors. I have reviewed the documentation provided by Dr. Nova Barker, PhD, Ainsley Mckoy. Dr. Lisa Parker is in her second year of Fellowship in Clinical Neuropsychology. Dr. Lisa Parker is licensed and credentialed to practice in the [facility], is providing the current services via her NPI and licensure, and had been providing similar services prior to her employment with [employer]. No additional insurance billing is done by me on her cases, my NPI is not being used, etc.   I have not had any face to face engagement with her patients, and am providing supervision and consultation services with her as she works towards advancing her career and subspecialities. I have reviewed the history, the neurocognitive and psychological test results, the medical records available, and the impressions and recommendations generated by Dr. Lisa Parker. We have engaged in peer to peer supervision as needed. I have reviewed history noted in the records, the tests administered, the test scores, and the overall case history and profile and report generated by Dr. Lisa Parker.   Dr. Mark Dan clinical case formulation, diagnostic impressions, and the proposed management plans/treatment recommendations are her own and based on her clinical training, level of expertise, and judgment. Any additional comments, concerns, or recommendations that I am making are offered here:  Profile consistent with rather severe, approaching end stage dementia here. Agree with tx recommendations and advise emotional support for family and in home services if needed - and consider transfer to assisted living. Prognosis is poor, unfortunately. Ezekiel Villarreal, [place], LCP Neuropsychology

## 2020-07-08 NOTE — PROGRESS NOTES
The INR is below the therapeutic range. Please make the following adjustments to Coumadin dosing: Continue Coumadin 5mg daily except 7.5mg Mon-Wed-Fri. No greens for a few days. Recheck 2 weeks.

## 2020-07-13 NOTE — TELEPHONE ENCOUNTER
Have you been diagnosed with, tested for, or told that you are suspected of having COVID-19 (coronovirus)? No  Have you had a fever or taken medication to treat a fever in the past 72 hours? No  Have you had a cough, SOB, or flu-like symptoms within the past 3 days? No  Have you had direct contact with someone who tested positive for COVID-19 within the past 14 days? No  Do you have a household member with flu-like symptoms, including fever, cough, or SOB? No  Do you currently have flu-like symptoms including fever, cough, or SOB? No    These answers apply to both patient and daughter in law who will be accompanying patient and going in room with her due to worsening dementia.

## 2020-07-14 NOTE — PROGRESS NOTES
Interactive Psychotherapy/office feedback        Interactive office feedback session with Ms. Geno Navarro, her two adult sons, and her two adult daughter-in-laws. .  I reviewed the results of the recent Neuropsychological Evaluation  including the observed areas of neurocognitive strengths and weaknesses. Education was provided regarding my diagnostic impressions, and treatment plan/options were discussed. I also answered numerous questions related to the clinical findings, including the various methods to improve cognition and mood. CBT, psychoeducation, and supportive psychotherapy techniques were utilized. Referrals were provided ([employer]). Prior to seeing the patient I reviewed the records, including the previously completed report, the records in [facility], and any updated visits from other providers since I saw the patient last.       Diagnoses:       1. Mixed Dementia, severe, with behavioral disturbance                The patient will follow up with the referring provider, and reported being very pleased with the services provided. [place] [facility] [place]. [id number] psychotherapy with patient and family. 45 minutes       This note will not be viewable in [employer]. This note was created using voice recognition software. Despite editing, there may be syntax errors. Rawleigh Denver is a 80 y.o. female who was evaluated by an audio-video encounter for concerns as above. Patient identification was verified prior to start of the visit. Pursuant to the emergency declaration under the [facility] 1135 waiver authority and the [employer] Act, this Virtual Visit was conducted, with patient's (and/or legal guardian's) consent, to reduce the patient's risk of exposure to COVID-19 and provide necessary medical care.      Services were provided through a synchronous discussion virtually to substitute for in-person clinic visit. I was at home. The patient was at home.

## 2020-07-15 NOTE — PROGRESS NOTES
This is the Subsequent Medicare Annual Wellness Exam, performed 12 months or more after the Initial AWV or the last Subsequent AWV    I have reviewed the patient's medical history in detail and updated the computerized patient record.      History     Patient Active Problem List   Diagnosis Code    Peripheral vascular disease ([facility] [address] I73.9    Urge incontinence of urine N39.41    Chronic a-fib (HCC) I48.20    H/O: stroke Z80.78    Pacemaker Z95.0    Current use of long term anticoagulation Z79.01    Lumbar degenerative disc disease M51.36    Pain of multiple sites R52    Pain of right lower extremity M79.604    Deformity of right foot M21.961    Lumbosacral radiculopathy M54.17    H/O total colectomy Z90.49    Macular degeneration of right eye H35.30    H/O fibromyalgia Z87.39    H/O: depression Z80.58    Elevated antinuclear antibody (TERESITA) level R76.8    H/O compression fracture of spine Z87.81     Past Medical History:   Diagnosis Date    Bradycardia     Carotid artery stenosis     Chronic atrial fibrillation (HCC)     CVA (cerebral vascular accident) ([facility] [address]     DDD (degenerative disc disease), cervical     Essential hypertension     Fibromyalgia     GERD (gastroesophageal reflux disease)     H/O burning pain in leg     right leg, current    Macular degeneration     right eye    Mitral valve disorder     Osteopenia     Spinal stenosis     TIA (transient ischemic attack)       Past Surgical History:   Procedure Laterality Date    HX CATARACT REMOVAL Bilateral     HX COLOSTOMY  2006    perfortated colon    HX GYN      HX HYSTERECTOMY      HX ORTHOPAEDIC      left ankle, ORIF, pins placed and taken out    HX PACEMAKER      cardiac pacemaker    HX PACEMAKER      HX ROTATOR CUFF REPAIR Right      Current Outpatient Medications   Medication Sig Dispense Refill    tolterodine ER (DETROL-LA) 2 mg ER capsule Take 1 capsule by mouth once daily 90 Cap 0    Omeprazole delayed release (PRILOSEC D/R) 20 mg tablet Take 40 mg by mouth daily. Indications: heartburn      CALCIUM PO Take 600 mg by mouth daily.  glucosamine HCl/chondroitin ordaz (GLUCOSAMINE-CHONDROITIN PO) Take  by mouth.  warfarin (COUMADIN) 2.5 mg tablet Take 1 Tab by mouth daily. Take one tab Monday-Wednesday along with 5 mg . Total 7.5 mg Monday- Wednesday. . Other days she will be on 5 mg (Patient taking differently: Take 2.5 mg by mouth daily. Take one tab Monday-Wednesday and Friday along with 5 mg . Total 7.5 mg Monday- Wednesday. . Other days she will be on 5 mg  Indications: prevention for a blood clot going to the brain) 48 Tab 0    warfarin (COUMADIN) 5 mg tablet Take 1 Tab by mouth daily. Take Monday-Wednesday 7.5 mg and rest of the week 5 mg. (Patient taking differently: Take 1 Tab by mouth daily. Take Monday-Wednesday 7.5 mg and rest of the week 5 mg. Indications: prevention for a blood clot going to the brain) 90 Tab 2    multivitamin (ONE A DAY) tablet Take 1 Tab by mouth daily. Indications: vitamin deficiency      cholecalciferol, vitamin D3, (VITAMIN D3 PO) Take 1,000 mg by mouth daily.  OTHER Take 1 Caplet by mouth daily. OTC vision supplement [employer] 50+ vitamin for Vision  Indications: blurred vision      DULoxetine (CYMBALTA) 20 mg capsule Take 1 Cap by mouth daily (with dinner). 30 Cap 1    acetaminophen (TYLENOL) 325 mg tablet Take 325 mg by mouth every six (6) hours as needed for Pain.  diclofenac (VOLTAREN) 1 % gel Apply 4 g to affected area daily.  Indications: joint damage causing pain and loss of function       Allergies   Allergen Reactions    Statins-Hmg-Coa Reductase Inhibitors Other (comments)     \"Intolerance- Medium reaction\"    Aspirin Other (comments)     On blood thinners    Citric Acid Other (comments)     Patient is not sure of reaction    Codeine Other (comments)    Cymbalta [Duloxetine] Other (comments)     Hallucination     Gabapentin Other (comments)       Family History   Problem Relation Age of Onset    Stroke Mother     Heart Failure Father      Social History     Tobacco Use    Smoking status: Never Smoker    Smokeless tobacco: Never Used   Substance Use Topics    Alcohol use: Never     Frequency: Never       Depression Risk Factor Screening:     3 most recent PHQ Screens 7/15/2020   PHQ Not Done (No Data)   Little interest or pleasure in doing things Not at all   Feeling down, depressed, irritable, or hopeless Not at all   Total Score PHQ 2 0       Alcohol Risk Factor Screening:   Do you average 1 drink per night or more than 7 drinks a week:  No    On any one occasion in the past three months have you have had more than 3 drinks containing alcohol:  No      Functional Ability and Level of Safety:   Hearing: Hearing is good. hearing is fair. wants test if it gets worse. she will let me know. Activities of Daily Living: The home contains: handrails and grab bars  Patient needs help with:  transportation, shopping, preparing meals, laundry, housework, managing medications, managing money, bathing and walking     Ambulation: with no difficulty and using walker. Fall Risk:  Fall Risk Assessment, last 12 mths 7/15/2020   Able to walk? Yes   Fall in past 12 months? Yes   Fall with injury? No   Number of falls in past 12 months 1   Fall Risk Score 1     Abuse Screen:  Patient is not abused       Cognitive Screening   Has your family/caregiver stated any concerns about your memory: yes - had cognitive function evaluation        Patient Care Team   Patient Care Team:  Gwen Lewis MD as PCP - General (Family Practice)  Gwen Lewis MD as PCP - [facility] Empaneled Provider    Assessment/Plan   Education and counseling provided:  Are appropriate based on today's review and evaluation  Discussed 5 years health plan and discussed advance directive. See ACP note from today. Diagnoses and all orders for this visit      1.  Medicare annual wellness visit, subsequent            Health Maintenance Due   Topic Date Due    DTaP/Tdap/Td series (1 - Tdap) 10/04/1955    Shingrix Vaccine Age 50> (1 of 2) 10/04/1984    Bone Densitometry (Dexa) Screening  10/04/1999     Refused tdap and shingrix. Will think about getting Dexa scan. Aged out for pap, mammogram and has colectomy bag so no need for colonoscopy.

## 2020-07-15 NOTE — PATIENT INSTRUCTIONS
Think about starting topomax for your leg pain. Think about getting dexa scan done as Dr. Smooth Huber recommended. Also someone will call you regarding neurology appt. Medicare Wellness Visit, Female     The best way to live healthy is to have a lifestyle where you eat a well-balanced diet, exercise regularly, limit alcohol use, and quit all forms of tobacco/nicotine, if applicable. Regular preventive services are another way to keep healthy. Preventive services (vaccines, screening tests, monitoring & exams) can help personalize your care plan, which helps you manage your own care. Screening tests can find health problems at the earliest stages, when they are easiest to treat. [facility] follows the current, evidence-based guidelines published by the [facility] Krissy ([facility]) when recommending preventive services for our patients. Because we follow these guidelines, sometimes recommendations change over time as research supports it. (For example, mammograms used to be recommended annually. Even though Medicare will still pay for an annual mammogram, the newer guidelines recommend a mammogram every two years for women of average risk). Of course, you and your doctor may decide to screen more often for some diseases, based on your risk and your co-morbidities (chronic disease you are already diagnosed with). Preventive services for you include:  - Medicare offers their members a free annual wellness visit, which is time for you and your primary care provider to discuss and plan for your preventive service needs. Take advantage of this benefit every year!  -All adults over the age of 72 should receive the recommended pneumonia vaccines.  Current USPSTF guidelines recommend a series of two vaccines for the best pneumonia protection.   -All adults should have a flu vaccine yearly and a tetanus vaccine every 10 years.   -All adults age 48 and older should receive the shingles vaccines (series of two vaccines). -All adults age 38-68 who are overweight should have a diabetes screening test once every three years.   -All adults born between [birth year] and 1965 should be screened once for Hepatitis C.  -Other screening tests and preventive services for persons with diabetes include: an eye exam to screen for diabetic retinopathy, a kidney function test, a foot exam, and stricter control over your cholesterol.   -Cardiovascular screening for adults with routine risk involves an electrocardiogram (ECG) at intervals determined by your doctor.   -Colorectal cancer screenings should be done for adults age 54-65 with no increased risk factors for colorectal cancer. There are a number of acceptable methods of screening for this type of cancer. Each test has its own benefits and drawbacks. Discuss with your doctor what is most appropriate for you during your annual wellness visit. The different tests include: colonoscopy (considered the best screening method), a fecal occult blood test, a fecal DNA test, and sigmoidoscopy.    -A bone mass density test is recommended when a woman turns 65 to screen for osteoporosis. This test is only recommended one time, as a screening. Some providers will use this same test as a disease monitoring tool if you already have osteoporosis. -Breast cancer screenings are recommended every other year for women of normal risk, age 54-69.  -Cervical cancer screenings for women over age 72 are only recommended with certain risk factors. Here is a list of your current Health Maintenance items (your personalized list of preventive services) with a due date:  Health Maintenance Due   Topic Date Due    DTaP/Tdap/Td  (1 - Tdap) 10/04/1955    Shingles Vaccine (1 of 2) 10/04/1984    Bone Mineral Density   10/04/1999            Dementia: Care Instructions  Your Care Instructions     Dementia is a loss of mental skills that affects your daily life.  It is different than the occasional trouble with memory that is part of aging. You may find it hard to remember things that you feel you should be able to remember. Or you may feel that your mind is just not working as well as usual.  Finding out that you have dementia is a shock. You may be afraid and worried about how the condition will change your life. Although there is no cure at this time, medicine may slow memory loss and improve thinking for a while. Other medicines may be able to help you sleep or cope with depression and behavior changes. Dementia often gets worse slowly. But it can get worse quickly. As dementia gets worse, it may become harder to do common things that take planning, like making a list and going shopping. Over time, the disease may make it hard for you to take care of yourself. Some people with dementia need others to help care for them. Dementia is different for everyone. You may be able to function well for a long time. In the early stage of the condition, you can do things at home to make life easier and safer. You also can keep doing your hobbies and other activities. Many people find comfort in planning now for their future needs. Follow-up care is a key part of your treatment and safety. Be sure to make and go to all appointments, and call your doctor if you are having problems. It's also a good idea to know your test results and keep a list of the medicines you take. How can you care for yourself at home? · Take your medicines exactly as prescribed. Call your doctor if you think you are having a problem with your medicine. · Eat healthy foods. Eat lots of whole grains, fruits, and vegetables every day. If you are not hungry, try snacks or nutritional drinks such as Boost, Ensure, or Sustacal.  · If you have problems sleeping:  ? Try not to nap too close to your bedtime. ? Exercise regularly. Walking is a good choice.   ? Try a glass of warm milk or caffeine-free herbal tea before bed.  · Do tasks and activities during the time of day when you feel your best. It may help to develop a daily routine. · Post labels, lists, and sticky notes to help you remember things. Write your activities on a calendar you can easily find. Put your clock where you can easily see it. · Stay active. Take walks in familiar places, or with friends or loved ones. Try to stay active mentally too. Read and work crossword puzzles if you enjoy these activities. · Do not drive unless you can pass an on-road driving test. If you are not sure if you are safe to drive, your state 's license bureau can test you. · Keep a cordless phone and a flashlight with new batteries by your bed. If possible, put a phone in each of the main rooms of your house, or carry a cell phone in case you fall and cannot reach a phone. Or, you can wear a device around your neck or wrist. You push a button that sends a signal for help. Acknowledge your emotions and plan for the future  · Talk openly and honestly with your doctor. · Let yourself grieve. It is common to feel angry, scared, frustrated, anxious, or depressed. · Get emotional support from family, friends, a support group, or a counselor experienced in working with people who have dementia. · Ask for help if you need it. · Tell your doctor how you feel. You may feel upset, angry, or worried at times. Many things can cause this, including poor sleep, medicine side effects, confusion, and pain. Your doctor may be able to help you. · Plan for the future. ? Talk to your family and doctor about preparing a living will and other important papers while you can make decisions. These papers tell your doctors how to care for you at the end of your life. ? Consider naming a person to make decisions about your care if you are not able to. When should you call for help? [id number] anytime you think you may need emergency care.  For example, call if:  · You are lost and do not know whom to call. · You are injured and do not know whom to call. Call your doctor now or seek immediate medical care if:  · You are more confused or upset than usual.  · You feel like you could hurt yourself because your mind is not working well. Watch closely for changes in your health, and be sure to contact your doctor if you have any problems. Where can you learn more? Go to http://www.gray.com/  Enter V090 in the search box to learn more about \"Dementia: Care Instructions. \"  Current as of: January 31, 2020               Content Version: 12.5  © 2042-4176 [employer]. Care instructions adapted under license by [employer] (which disclaims liability or warranty for this information). If you have questions about a medical condition or this instruction, always ask your healthcare professional. [address] any warranty or liability for your use of this information. Compression Fracture of the Spine: Care Instructions  Your Care Instructions     A compression fracture happens when the front part of a spinal bone breaks and collapses. A fall or other accident can cause it. A minor injury or moving the wrong way can cause a break if you have thin or brittle bones (osteoporosis). These types of breaks will heal in 8 to 10 weeks. You will need rest and pain medicines. Your doctor may recommend physical therapy. Some doctors recommend that certain people with compression fractures wear braces. Your doctor also may treat thin or brittle bones. You may need surgery if you have lasting pain or if the bone presses on the spinal cord or nerves. You heal best when you take good care of yourself. Eat a variety of healthy foods, and don't smoke. Follow-up care is a key part of your treatment and safety. Be sure to make and go to all appointments, and call your doctor if you are having problems.  It's also a good idea to know your test results and keep a list of the medicines you take. How can you care for yourself at home? · Be safe with medicines. Read and follow all instructions on the label. ? If the doctor gave you a prescription medicine for pain, take it as prescribed. ? If you are not taking a prescription pain medicine, ask your doctor if you can take an over-the-counter medicine. · Talk to your doctor about how to make your bones stronger. You may need medicines or a change in what you eat. · Be active only as directed by your doctor. When should you call for help? [id number] anytime you think you may need emergency care. For example, call if:  · You are unable to move an arm or a leg at all. Call your doctor now or seek immediate medical care if:  · You have new or worse symptoms in your arms, legs, belly, or buttocks. Symptoms may include:  ? Numbness or tingling. ? Weakness. ? Pain. · You lose bladder or bowel control. · You have belly pain, bloating, vomiting, or nausea. Watch closely for changes in your health, and be sure to contact your doctor if:  · You do not get better as expected. Where can you learn more? Go to http://pedro-abiodun.info/  Enter P445 in the search box to learn more about \"Compression Fracture of the Spine: Care Instructions. \"  Current as of: March 2, 2020               Content Version: 12.5  © 0493-1766 [employer]. Care instructions adapted under license by [employer] (which disclaims liability or warranty for this information). If you have questions about a medical condition or this instruction, always ask your healthcare professional. [place] [address] any warranty or liability for your use of this information.

## 2020-07-15 NOTE — ACP (ADVANCE CARE PLANNING)
Advance Care Planning       Advance Care Planning (ACP) Physician/NP/PA (Provider) Conversation        Date of ACP Conversation: 7/15/2020    Conversation Conducted with:   Patient with Decision Making Capacity   Healthcare Decision Maker: Named in Advance Directive or Healthcare Power of  (name) Red vides is primary decisoin maker and other son Franklin Cullen:    Current Designated Health Care Decision Maker:   Primary Decision Maker (Active): Bandar&Julio Hairston, - Child - [phone]  (If there is a [address] named in the \"Healthcare Decision Makers\" box in the ACP activity, but it is not visible above, be sure to open that field and then select the health care decision maker relationship (ie \"primary\") in the blank space to the right of the name.)    Note: Assess and validate information in current ACP documents, as indicated. Pt has an advance directive and it was given today. Will scan it in the chart. Care Preferences:    Hospitalization: \"If your health worsens and it becomes clear that your chance of recovery is unlikely, what would your preference be regarding hospitalization? \"  Has given advance directive today and pt wish to follow it       Ventilation: \"If you were in your present state of health and suddenly became very ill and were unable to breathe on your own, what would your preference be about the use of a ventilator (breathing machine) if it was available to you? \"    Given advance directive today and pt wish to follow it. \"If your health worsens and it becomes clear that your chance of recovery is unlikely, what would your preference be about the use of a ventilator (breathing machine) if it was available to you? \"   Given advance directive today and wish to follow it. Resuscitation:  \"CPR works best to restart the heart when there is a sudden event, like a heart attack, in someone who is otherwise healthy.  Unfortunately, CPR does not typically restart the heart for people who have serious health conditions or who are very sick. \"    \"In the event your heart stopped as a result of an underlying serious health condition, would you want attempts to be made to restart your heart (answer \"yes\" for attempt to resuscitate) or would you prefer a natural death (answer \"no\" for do not attempt to resuscitate)? \"   An advance directive given today. Wants to follow it. NOTE: If the patient has a valid advance directive AND provides care preference(s) that are inconsistent with that prior directive, advise the patient to consider either: creating a new advance directive that complies with state-specific requirements; or, if that is not possible, orally revoking that prior directive in accordance with state-specific requirements, which must be documented in the EHR. Conversation Outcomes / Follow-Up Plan:   Has an advance directive. Copy provided today. Wish to follow it.        Length of Voluntary ACP Conversation in minutes:  <16 minutes (Non-Billable)      Maxi Campos MD

## 2020-07-15 NOTE — PROGRESS NOTES
1. Have you been to the ER, urgent care clinic since your last visit? Hospitalized since your last visit? No    2. Have you seen or consulted any other health care providers outside of the [address] [place] [address] since your last visit? Include any pap smears or colon screening. No       Have you been diagnosed with, tested for, or told that you are suspected of having COVID-19 (coronovirus)? No  Have you had a fever or taken medication to treat a fever in the past 72 hours? No  Have you had a cough, SOB, or flu-like symptoms within the past 3 days? No  Have you had direct contact with someone who tested positive for COVID-19 within the past 14 days? No  Do you have a household member with flu-like symptoms, including fever, cough, or SOB? No  Do you currently have flu-like symptoms including fever, cough, or SOB? No     These answers apply to both patient and daughter in law who will be accompanying patient and going in room with her due to worsening dementia.

## 2020-07-16 NOTE — PROGRESS NOTES
HISTORY OF PRESENT ILLNESS  Danya Stiles is a 80 y.o. female. HPI: Here for follow up. H/o PVD. Leg pain, tingling. More over rt lower ext. Following Dr. Manny Nice at this time as compression fracture over thoracic and lumbar spine. Could be radiculopathy in lower ext. She was given trial of cymbalta but due to hallucination she has stopped taking medicaiton. Also did not tolerate gabapentin. For now discussed topamax for symptomatic treatment but she will think about it. Her daughter in law was present during visit. H/o dementia. Recently had neuropsychology evaluation and noted congnifive dysfunction. Will do a neurology referral.   No behavior changes. Appetite, weight and sleep been stable. No depression or anxiety or panic attack. Did fall couple of times. Now using walker all the time. During visit sitting comfortable and did not appear in any acute distress. H/o anxiety and depression and again psychology evaluation also suggested mild. For now pt and her daughter in law has expressed that does not want any medication for it as she does not tolerate it well. H/o hypertension. Vitals stable. Also chronic a.fib. following cardiology for INR. Does home check. No concern. No easy bruise. Has pending appt with urology. For now fairly stable incontinence. No symptoms of UTI  Visit Vitals  /70 (BP 1 Location: Right arm, BP Patient Position: Sitting)   Pulse 61   Temp 98.6 °F (37 °C) (Oral)   Resp 14   Ht 4' 11\" (1.499 m)   Wt 118 lb 6 oz (53.7 kg)   SpO2 97%   BMI 23.91 kg/m²     Review medication list, vitals, problem list,allergies. ROS: no headache or dizziness, no chest pain or sob. No palpitation or diaphoresis. No abdominal pain. Appetite is fair. Weight has been stable. No behavior changes. Physical Exam  Neck:      Musculoskeletal: Neck supple. Cardiovascular:      Rate and Rhythm: Normal rate. Pulmonary:      Effort: No respiratory distress. Breath sounds:  No wheezing. Abdominal:      Palpations: Abdomen is soft. Tenderness: There is no abdominal tenderness. Musculoskeletal:         General: No swelling. Lymphadenopathy:      Cervical: No cervical adenopathy. Neurological:      General: No focal deficit present. Mental Status: She is alert. Psychiatric:         Behavior: Behavior normal.         ASSESSMENT and PLAN    ICD-10-CM ICD-9-CM    1. Peripheral vascular disease (HCC)  I73.9 443.9    2. Essential hypertension : fairly stable. Not on any medication. Low salt diet. I10 401.9    3. Dementia without behavioral disturbance, unspecified dementia type ([facility] :  F03.90 294.20 REFERRAL TO NEUROLOGY   4. Pain of lower extremity, unspecified laterality  M79.606 729.5 REFERRAL TO NEUROLOGY    rt lower ext tingling. could not tolerate cymbalta and gabapetnin. discussed topamax    5. Irregular heart beat : on coumadine. Managed by cardiology. I49.9 427.9    6. H/O compression fracture of spine : pain fairly stable. Walking with walker for fall prevention. Has rt lower ext pain could be radiculopathy. Did not tolerate gabapentin and cymbalta. Discussed topamax. Pt will think about it. Z87.81 V15.51     thoracic and lumbar spine. seen Dr. Shannon Herrera. ordered dexa and given cymbalta trial, walker for fall prevention . stopped cymbalta due to side effects hallucinatio   7. H/O cardiac pacemaker : recently checked. No concern. Z95.0 V12.50      V45.01     following cardio. 8. Anxiety and depression : for now done psychology eval. Mild depression and anxiety. Pt does not want any medication. F41.9 300.00     F32.9 311     done neuropsychology eval. seen in the chart. anxiety and depression. cognitive disorder    9. H/O TIA (transient ischemic attack) and stroke : for now risk factor management. Z86.73 V12.54    10. H/O fibromyalgia : for now fatigue and pain stable. Did not tolerate cymbalta or gabapentin in the past. Will observe.   Z87.39 V13.59 REFERRAL TO NEUROLOGY   11. Urge incontinence of urine : improvement with symptomatic treatment. Pending urology appt  N39.41 788.31    12. Medicare annual wellness visit, subsequent  Z00.00 V70.0    13. H/O total colectomy : no concern. Z90.49 V45.89     has colastomy bag. following GI    Pt understood and agree with the plan   Review    Follow-up and Dispositions    · Return in about 4 months (around 11/15/2020).

## 2020-07-24 NOTE — PROGRESS NOTES
Verbal order and read back per Lizandro Figueroa MD  10 mg x1  then 7.5 mg x 1 Continue Coumadin 5mg daily except 7.5mg Mon-Wed-Fri. No greens for a few days. Recheck 2 weeks.

## 2020-08-12 NOTE — PROGRESS NOTES
Verbal order and read back per Marianne Gaming, DO  The INR is below the therapeutic range. Please make the following adjustments to Coumadin dosing: 10 mg x1  thenContinue Coumadin 5mg daily except 7.5mg Mon-Wed-Fri. No greens for a few days. Recheck 1 week. Anju Shanks

## 2020-08-31 NOTE — PROGRESS NOTES
Verbal order and read back per Chantale Barraza NP  INR below therapeutic range  Coumadin 10mg x1 then change to Coumadin 7.5mg daily except 5mg on Tuesdays and Thursdays. Recheck 1 week. Patient's daughter-in-law made aware of dosing and recheck instructions, no questions.

## 2020-09-10 NOTE — PROGRESS NOTES
Meagan Packwood presents today for Chief Complaint Patient presents with  Follow-up 6 month follow up Meagan Blair preferred language for health care discussion is english/other. Is someone accompanying this pt? Daughter in law Is the patient using any DME equipment during OV? walker Depression Screening: 
3 most recent PHQ Screens 9/10/2020 PHQ Not Done - Little interest or pleasure in doing things Not at all Feeling down, depressed, irritable, or hopeless Not at all Total Score PHQ 2 0 Learning Assessment: 
Learning Assessment 9/10/2020 PRIMARY LEARNER Patient HIGHEST LEVEL OF EDUCATION - PRIMARY LEARNER  -  
BARRIERS PRIMARY LEARNER -  
[id number] [place] [address] NAME -  
CO-LEARNER HIGHEST LEVEL OF EDUCATION -  
Anny Stein[address] -  
PRIMARY LANGUAGE ENGLISH  
PRIMARY LANGUAGE CO-LEARNER -  
 NEED -  
LEARNER PREFERENCE PRIMARY DEMONSTRATION  
  -  
LEARNER PREFERENCE [address] [place] [address] -  
ANSWERED BY patient RELATIONSHIP SELF Abuse Screening: 
Abuse Screening Questionnaire 9/10/2020 Do you ever feel afraid of your partner? Clair Roche Are you in a relationship with someone who physically or mentally threatens you? Clair Roche Is it safe for you to go home? [facility] Fall Risk Fall Risk Assessment, last 12 mths 9/10/2020 Able to walk? Yes Fall in past 12 months? No  
Fall with injury? -  
Number of falls in past 12 months - Fall Risk Score - Pt currently taking Anticoagulant therapy? Warfarin 7.5 mg daily except 5 mg Tuesday-Thursday Coordination of Care: 1. Have you been to the ER, urgent care clinic since your last visit? Hospitalized since your last visit? no 
 
2. Have you seen or consulted any other health care providers outside of the [address] [place] [address] since your last visit? Include any pap smears or colon screening.  no

## 2020-09-10 NOTE — PROGRESS NOTES
HPI: An 80-year old female here for follow up. She is accompanied by her daughter-in-law as usual. She has been doing well. She has had no chest pain, dyspnea, orthopnea, PND or edema. She gets around with a walker. Her  passed away about a year ago. She moved to the area and lives with her son and daughter-in-law. Impression/Plan: 1. Single chamber [employer] pacemaker approaching VOLODYMYR, still < 6 months remaining. She is paced approximately 45%. 2. Chronic atrial fibrillation on coumadin. She has a history of falls but none recently. 3. History of fibromyalgia. 4. Dyslipidemia. 5. Remote TIA. 6. Mild dementia. 7. Gastroesophageal reflux disease. 8. Poor functional status with limited mobility using a walker. Pacemaker is functioning normally but will be approaching VOLODYMYR within about 6 months. This is minimally changed from when I saw her in March. I will plan to have a device check in 3 months and see her back personally in 6 months. I will give her a prescription for Coumadin that she is taking. Past Medical History:  
Diagnosis Date  Bradycardia  Carotid artery stenosis  Chronic atrial fibrillation ([address]  CVA (cerebral vascular accident) ([address]  DDD (degenerative disc disease), cervical   
 Essential hypertension  Fibromyalgia  GERD (gastroesophageal reflux disease)  H/O burning pain in leg   
 right leg, current  Macular degeneration   
 right eye  Mitral valve disorder  Osteopenia  Spinal stenosis  TIA (transient ischemic attack) Current Outpatient Medications Medication Sig Dispense Refill  warfarin (COUMADIN) 5 mg tablet Take 1 Tab by mouth daily.  As directed  Indications: prevention for a blood clot going to the brain 135 Tab 3  
 tolterodine ER (DETROL-LA) 2 mg ER capsule Take 1 capsule by mouth once daily 90 Cap 0  
 pantoprazole (PROTONIX) 20 mg tablet Take 1 tablet by mouth once daily 90 Tab 1  
  CALCIUM PO Take 600 mg by mouth daily.  glucosamine HCl/chondroitin ordaz (GLUCOSAMINE-CHONDROITIN PO) Take  by mouth.  acetaminophen (TYLENOL) 325 mg tablet Take 325 mg by mouth every six (6) hours as needed for Pain.  diclofenac (VOLTAREN) 1 % gel Apply 4 g to affected area daily. Indications: joint damage causing pain and loss of function  multivitamin (ONE A DAY) tablet Take 1 Tab by mouth daily. Indications: vitamin deficiency  cholecalciferol, vitamin D3, (VITAMIN D3 PO) Take 1,000 mg by mouth daily.  OTHER Take 1 Caplet by mouth daily. OTC vision supplement [facility] vitamin for Vision  Indications: blurred vision Social History 
 reports that she has never smoked. She has never used smokeless tobacco. 
 reports no history of alcohol use. Family History 
family history includes Heart Failure in her father; Stroke in her mother. Review of Systems Except as stated above include: 
Constitutional: Negative for fever, chills and malaise/fatigue. HEENT: No congestion or recent URI. Gastrointestinal: No nausea, vomiting, abdominal pain, bloody stools. Pulmonary:  Negative except as stated above. Cardiac:  Negative except as stated above. Musculoskeletal: Negative except as stated above. Neurological:  No localized symptoms. Skin:  Negative except as stated above. Psych:  Negative except as stated above. Endocrine:  Negative except as stated above. PHYSICAL EXAM 
BP Readings from Last 3 Encounters:  
09/10/20 114/64  
07/15/20 106/70  
03/12/20 110/70 Pulse Readings from Last 3 Encounters:  
09/10/20 83  
07/15/20 61  
03/12/20 74 Wt Readings from Last 3 Encounters:  
09/10/20 54 kg (119 lb)  
07/15/20 53.7 kg (118 lb 6 oz) 03/12/20 57.2 kg (126 lb) General:   Well developed, well groomed. Head/Neck:   No jugular venous distention No carotid bruits. No evidence of xanthelasma. Lungs:   No respiratory distress. Clear bilaterally. Heart:    Regular rate and rhythm. Normal S1/S2. Palpation of heart with normal point of maximum impulse. No significant murmurs, rubs or gallops. Pacer pocket intact. Abdomen:   Soft and nontender. No palpable abdominal mass or bruits. Extremities:   Intact peripheral pulses. No significant edema. Neurological:   Alert and oriented to person, place, time. No focal neurological deficit visually. Skin:   No obvious rash Blood Pressure Metric: 
Monitor recommended and adjustments stated if needed.

## 2020-09-11 NOTE — PROGRESS NOTES
Verbal order and read back per Kirk Amaya, FORREST  INR below therapeutic range  Coumadin 10mg x1 then change to Coumadin 7.5mg daily. Recheck 1 week. Patient's daughter-in-law made aware of dosing and recheck instructions, no questions. Recheck will be done next Thursday due to family going out of town Friday.

## 2020-09-22 NOTE — PROGRESS NOTES
Verbal order and read back per Cora Avilez NP 
INR within therapeutic range Coumadin 7.5mg daily. Recheck 2 weeks. Patient's daughter-in-law made aware of dosing and recheck instructions, no questions.

## 2020-10-06 NOTE — PROGRESS NOTES
Verbal order and read back per Jossy Reveal, NP 
INR within therapeutic range Coumadin 7.5mg daily. Recheck 2 weeks. Patient made aware of dosing and recheck instructions, no questions.

## 2020-11-09 NOTE — PROGRESS NOTES
Verbal order and read back per Dontae Parsons, DO 
INR within therapeutic range 
continue Coumadin 7.5mg daily. Recheck 2 weeks. Patient's daughter-in-law made aware of dosing and recheck instructions, no questions.

## 2020-11-16 NOTE — TELEPHONE ENCOUNTER
Have you been diagnosed with, tested for, or told that you are suspected of having COVID-19 (coronovirus)? Have you had a fever or taken medication to treat a fever in the past 72 hours? Have you had a cough, SOB, or flu-like symptoms within the past 3 days? Have you had direct contact with someone who tested positive for COVID-19 within the past 14 days? Do you have a household member with flu-like symptoms, including fever, cough, or SOB? Do you currently have flu-like symptoms including fever, cough, or SOB? Are you experiencing new loss of taste or smell?           ALL ANSWERS TO THE ABOVE QUESTIONS IS NO  Pt and daughter in law

## 2020-11-17 PROBLEM — F03.90 DEMENTIA WITHOUT BEHAVIORAL DISTURBANCE (HCC): Status: ACTIVE | Noted: 2020-01-01

## 2020-11-17 NOTE — PROGRESS NOTES
HISTORY OF PRESENT ILLNESS Daisy Dallas is a 80 y.o. female. HPI: Here for follow-up. Accompanied with her daughter-in-law. Had couple of falls since last visit. More mechanical due to not use of walker and support. No specific joint pain or swelling at this time. No bruise. She is on long-term anticoagulation due to A. fib. Denies any aches or pains at this time. Per daughter-in-law patient been not complaining about pain or asking Tylenol at this time. She does get a daily activity with the observation. She does get help with ADLs once a week. Currently following cardiology. Has pacemaker. No new recommendations. History of dementia. No behavioral changes. Had a neuropsychological evaluation showed a cognitive deficit. Currently pending neurological referral for further recommendations. Has a chronic right lower extremity burning pain. Probably neuropathy. Could not tolerate Cymbalta or gabapentin. Currently taking symptomatic treatment with Tylenol or Motrin. Helping some. During the visit she was sitting comfortable. Dressed appropriately. Well hygiene. Sitting comfortable and did not appear in any acute distress. Visit Vitals /72 (BP 1 Location: Right arm, BP Patient Position: Sitting) Pulse 73 Temp 97.9 °F (36.6 °C) (Oral) Resp 16 Ht 4' 11\" (1.499 m) Wt 119 lb (54 kg) SpO2 97% BMI 24.04 kg/m² Review medication list, vitals, problem list,allergies. ROS: See HPI Physical Exam 
Cardiovascular:  
   Rate and Rhythm: Normal rate. Pulmonary:  
   Effort: No respiratory distress. Breath sounds: No wheezing. Abdominal:  
   Tenderness: There is no abdominal tenderness. Musculoskeletal:     
   General: No swelling. Neurological:  
   General: No focal deficit present. Mental Status: She is alert. ASSESSMENT and PLAN 
  ICD-10-CM ICD-9-CM 1.  Encounter for immunization  Z23 V03.89 ADMIN INFLUENZA VIRUS VAC  
 INFLUENZA VACCINE INACTIVATED (IIV), SUBUNIT, ADJUVANTED, IM  
2. Chronic a-fib ([facility]): On anticoagulation. PT/INR managed by cardiology I48.20 427.31   
3. Urge incontinence of urine: Fairly stable on symptomatic treatment. Seen the urology. Recently treated  for UTI. increase the dose of Detrol LA by urologist.  Helping. Will observe. N39.41 788.31   
4. Pacemaker  Z95.0 V45.01   
5. Current use of long term anticoagulation  Z79.01 V58.61 6. Lumbar degenerative disc disease: Prior history of compression fracture. Given number for central scheduling as noted DEXA scan been ordered by specialist.  She will complete them M51.36 722.52   
7. H/O total colectomy  Z90.49 V45.89   
8. H/O fibromyalgia: Fairly stable at this time Z87.39 V13.59   
9. H/O compression fracture of spine  Z87.81 V15.51   
10. Pain in rt lower extremities: Chronic. Going on since years. Even started before compression fracture. Currently using walker as a support. Has a nurse aide comes once a week. Family supportive. Daughter-in-law supervise for ADL. Will observe M79.604 729.5   
 M79.605 11. Frequent falls: More mechanical due to lack of use of walker or improper use of walker. For now observe. Partly discomfort due to dementia. Pending appointment with the neurology to discuss the treatment plan further. R29.6 V15.88 Patient understood and agreed with the plan Follow-up and Dispositions · Return in about 6 months (around 5/17/2021). addendum: 
Patient Active Problem List  
 Diagnosis Date Noted  Dementia without behavioral disturbance ([facility] 11/17/2020  Urge incontinence of urine 01/13/2020  Chronic a-fib ([facility] 01/13/2020  H/O: stroke 01/13/2020  Pacemaker 01/13/2020  Current use of long term anticoagulation 01/13/2020  Lumbar degenerative disc disease 01/13/2020  Pain of multiple sites 01/13/2020  Pain of right lower extremity 01/13/2020  Deformity of right foot 01/13/2020  Lumbosacral radiculopathy 01/13/2020  H/O total colectomy 01/13/2020  Macular degeneration of right eye 01/13/2020  H/O fibromyalgia 01/13/2020  H/O: depression 01/13/2020  Elevated antinuclear antibody (TERESITA) level 01/13/2020  H/O compression fracture of spine 01/13/2020  Peripheral vascular disease ([address] 11/18/2019 Current Outpatient Medications Medication Sig Dispense Refill  warfarin (COUMADIN) 2.5 mg tablet Take 2 Tabs by mouth daily. As directed  Indications: prevention for a blood clot going to the brain (Patient taking differently: Take 7.5 mg by mouth daily. As directed 7.5 mg  Indications: prevention for a blood clot going to the brain) 90 Tab 6  pantoprazole (PROTONIX) 20 mg tablet Take 1 tablet by mouth once daily 90 Tab 1  
 CALCIUM PO Take 600 mg by mouth daily.  glucosamine HCl/chondroitin ordaz (GLUCOSAMINE-CHONDROITIN PO) Take  by mouth.  acetaminophen (TYLENOL) 325 mg tablet Take 325 mg by mouth every six (6) hours as needed for Pain.  diclofenac (VOLTAREN) 1 % gel Apply 4 g to affected area daily. Indications: joint damage causing pain and loss of function  multivitamin (ONE A DAY) tablet Take 1 Tab by mouth daily. Indications: vitamin deficiency  cholecalciferol, vitamin D3, (VITAMIN D3 PO) Take 1,000 mg by mouth daily.  OTHER Take 1 Caplet by mouth daily. OTC vision supplement [facility] vitamin for Vision  Indications: blurred vision  warfarin (COUMADIN) 5 mg tablet Take 1 Tab by mouth daily. As directed. 90 Tab 3  
 tolterodine ER (DETROL LA) 4 mg ER capsule Take 1 Cap by mouth daily. 90 Cap 3 Allergies Allergen Reactions  Statins-Hmg-Coa Reductase Inhibitors Other (comments) \"Intolerance- Medium reaction\"  Aspirin Other (comments) On blood thinners  Citric Acid Other (comments) Patient is not sure of reaction  Codeine Other (comments)  Cymbalta [Duloxetine] Other (comments) Hallucination  Gabapentin Other (comments) Past Medical History:  
Diagnosis Date  Bradycardia  Carotid artery stenosis  Chronic atrial fibrillation ([facility]  CVA (cerebral vascular accident) ([facility]  DDD (degenerative disc disease), cervical   
 Essential hypertension  Fibromyalgia  GERD (gastroesophageal reflux disease)  H/O burning pain in leg   
 right leg, current  Macular degeneration   
 right eye  Mitral valve disorder  Osteopenia  Spinal stenosis  TIA (transient ischemic attack) Past Surgical History:  
Procedure Laterality Date  HX CATARACT REMOVAL Bilateral   
 HX COLOSTOMY  2006  
 perfortated colon  HX GYN    
 HX HYSTERECTOMY  HX ORTHOPAEDIC    
 left ankle, ORIF, pins placed and taken out  HX PACEMAKER    
 cardiac pacemaker  HX PACEMAKER    
 HX ROTATOR CUFF REPAIR Right Family History Problem Relation Age of Onset  Stroke Mother  Heart Failure Father Social History Tobacco Use  Smoking status: Never Smoker  Smokeless tobacco: Never Used Substance Use Topics  Alcohol use: Never Frequency: Never

## 2020-11-17 NOTE — PATIENT INSTRUCTIONS
Medicare Wellness Visit, Female The best way to live healthy is to have a lifestyle where you eat a well-balanced diet, exercise regularly, limit alcohol use, and quit all forms of tobacco/nicotine, if applicable. Regular preventive services are another way to keep healthy. Preventive services (vaccines, screening tests, monitoring & exams) can help personalize your care plan, which helps you manage your own care. Screening tests can find health problems at the earliest stages, when they are easiest to treat. [facility] follows the current, evidence-based guidelines published by the [facility] ([facility]) when recommending preventive services for our patients. Because we follow these guidelines, sometimes recommendations change over time as research supports it. (For example, mammograms used to be recommended annually. Even though Medicare will still pay for an annual mammogram, the newer guidelines recommend a mammogram every two years for women of average risk). Of course, you and your doctor may decide to screen more often for some diseases, based on your risk and your co-morbidities (chronic disease you are already diagnosed with). Preventive services for you include: - Medicare offers their members a free annual wellness visit, which is time for you and your primary care provider to discuss and plan for your preventive service needs. Take advantage of this benefit every year! 
-All adults over the age of 72 should receive the recommended pneumonia vaccines. Current USPSTF guidelines recommend a series of two vaccines for the best pneumonia protection.  
-All adults should have a flu vaccine yearly and a tetanus vaccine every 10 years.  
-All adults age 48 and older should receive the shingles vaccines (series of two vaccines). -All adults age 38-68 who are overweight should have a diabetes screening test once every three years. -All adults born between [birth year] and 1965 should be screened once for Hepatitis C. 
-Other screening tests and preventive services for persons with diabetes include: an eye exam to screen for diabetic retinopathy, a kidney function test, a foot exam, and stricter control over your cholesterol.  
-Cardiovascular screening for adults with routine risk involves an electrocardiogram (ECG) at intervals determined by your doctor.  
-Colorectal cancer screenings should be done for adults age 54-65 with no increased risk factors for colorectal cancer. There are a number of acceptable methods of screening for this type of cancer. Each test has its own benefits and drawbacks. Discuss with your doctor what is most appropriate for you during your annual wellness visit. The different tests include: colonoscopy (considered the best screening method), a fecal occult blood test, a fecal DNA test, and sigmoidoscopy. 
 
-A bone mass density test is recommended when a woman turns 65 to screen for osteoporosis. This test is only recommended one time, as a screening. Some providers will use this same test as a disease monitoring tool if you already have osteoporosis. -Breast cancer screenings are recommended every other year for women of normal risk, age 54-69. 
-Cervical cancer screenings for women over age 72 are only recommended with certain risk factors. Here is a list of your current Health Maintenance items (your personalized list of preventive services) with a due date: 
Health Maintenance Due Topic Date Due  
 DTaP/Tdap/Td  (1 - Tdap) 10/04/1955  Shingles Vaccine (1 of 2) 10/04/1984  Bone Mineral Density   10/04/1999  Yearly Flu Vaccine (1) 09/01/2020

## 2020-11-17 NOTE — PROGRESS NOTES
1. Have you been to the ER, urgent care clinic since your last visit? Hospitalized since your last visit? No 
 
2. Have you seen or consulted any other health care providers outside of the [address] [place] [address] since your last visit? Include any pap smears or colon screening. Dr. Jolly Almaguer: 11/17/2020 [facility]) and Dr. Ebony ARIAS: 11/03/2020. Chief Complaint Patient presents with  Fall  
  11/12/2020 and 11/14/2020  Hypertension  Dementia  Other  
  lower extremity pain  Anxiety  Depression

## 2020-11-17 NOTE — PROGRESS NOTES
Patient presents for the following vaccines: Influenza (FLUAD). Patient consent obtained by patient. Patient tolerated procedure well at right deltoid. Patient remained in exam room, during vaccine documentation, for period of 10 minutes post injection to ensure no reaction. VIS was given to patient. Lot # [id number] Exp: 6/15/2021 MFG: Darnell Henley 
[address]: [id number]

## 2020-11-23 NOTE — PROGRESS NOTES
Verbal order and read back per Yuniel Stewart, DO 
INR within therapeutic range 
continue Coumadin 7.5mg daily. Recheck 2 weeks. Patient's daughter made aware of dosing and recheck instructions, no questions.

## 2020-12-09 NOTE — LETTER
12/9/2020 2:36 PM 
 
 
 
Gena Torres 
[id number] 
1934 Insurance:  Medicare/[facility] Sera # No Auth Needed Proc Date: Mon. 12/28                Proc Time:  1:00pm 
 
Performing MD : Dr. Jann Reyes Hospital:  [facility]                                            PCP Dr. Sola Limon 
 
Scheduled with:  Maye/EMail                                                        Date:12/9/2020 HP: 12/9     EKG: ______    Labs:______  CXR: _______  Orders:  12/9 Special Instructions:  _____________________________________________________ 
______________________________________________________________________ 
______________________________________________________________________ Date Faxed:   ______________   Pages Faxed: ___________________ The materials enclosed with this facsimile transmission are private and confidential and are the property of the sender. If you are not the intended recipient, be advised that any unauthorized use, disclosure, copying, distribution, or the taking of any action in reliance on the contents of this telecopied information is strictly prohibited. If you have received this in error, please immediately notify the sender via telephone to arrange for return of the forwarded documentation.

## 2020-12-09 NOTE — PROGRESS NOTES
Emmie Elias presents today for Chief Complaint Patient presents with  Follow-up 3 month follow up Emmie Elias preferred language for health care discussion is english/other. Is someone accompanying this pt? yes Is the patient using any DME equipment during OV? walker Depression Screening: 
3 most recent PHQ Screens 12/9/2020 PHQ Not Done - Little interest or pleasure in doing things Not at all Feeling down, depressed, irritable, or hopeless Not at all Total Score PHQ 2 0 Learning Assessment: 
Learning Assessment 12/9/2020 PRIMARY LEARNER Patient HIGHEST LEVEL OF EDUCATION - PRIMARY LEARNER  -  
BARRIERS PRIMARY LEARNER -  
[address] [place] [address] NAME -  
CO-LEARNER HIGHEST LEVEL OF EDUCATION -  
An[address] -  
PRIMARY LANGUAGE ENGLISH  
PRIMARY LANGUAGE CO-LEARNER -  
 NEED -  
LEARNER PREFERENCE PRIMARY DEMONSTRATION  
  -  
LEARNER PREFERENCE [address] [place] [address] -  
ANSWERED BY patient RELATIONSHIP SELF Abuse Screening: 
Abuse Screening Questionnaire 12/9/2020 Do you ever feel afraid of your partner? Karalee Schlatter Are you in a relationship with someone who physically or mentally threatens you? Karalee Schlatter Is it safe for you to go home? Betty Lino Fall Risk Fall Risk Assessment, last 12 mths 12/9/2020 Able to walk? Yes Fall in past 12 months? No  
Fall with injury? -  
Number of falls in past 12 months - Fall Risk Score - Pt currently taking Anticoagulant therapy? no 
 
Coordination of Care: 1. Have you been to the ER, urgent care clinic since your last visit? Hospitalized since your last visit? no 
 
2. Have you seen or consulted any other health care providers outside of the [address] [place] [address] since your last visit? Include any pap smears or colon screening.  no

## 2020-12-09 NOTE — PATIENT INSTRUCTIONS
[facility] Patient  EP Instructions 1. You are scheduled to have a Pacemaker gen change on  December 28, 2020 , at 1 pm.    Please check in at 12 pm. 
 
2. Please go to [facility] and park in the outpatient parking lot that is located around to the back of the hospital and enter through the [employer]. Once you enter through the [facility] check in with the  there. The  will either give you directions or assist you in getting to the cath holding area. 3.  You are not to eat or drink anything after midnight the night before your  procedure. 4. Please continue to take your medications with a small sip of water on the morning of the procedure with the following exceptions:  Hold Coumadin starting December 24, 2020. 
 
5. If you are diabetic, do not take your insulin/sugar pill the morning of the procedure. 6. We encourage families to wait in the waiting room on the first floor while the procedure is being done. The Doctor will come out and talk with you as soon as the procedure is over. 7. There is the possibility that you may spend the night in the hospital, depending on the results of the procedure. This will be determined after the procedure is done. 8.   If you or your family have any questions, please call our office Monday-Friday 9:00am  
      -4:30 pm , at [phone], and ask to speak to one of the nurses.

## 2020-12-09 NOTE — PROGRESS NOTES
History of Present Illness:  80year old female here for follow up. She is accompanied by her daughter-in-law as usual.  Overall she is doing relatively well. No new chest pain, dyspnea, PND, orthopnea, edema. Her functional status is limited due to her DJD and deconditioning. She uses a walker regularly. She is new to the area and lives with her son and daughter-in-law for about the past 18 months when her  passed. Impression: 1. Single chamber [employer] pacemaker, now at [employer] triggered in September. She is paced approximately 45% of the time. 2. Chronic atrial fibrillation, on Coumadin, with history of falls, but none recently. 3. Chronic deconditioning, using walker. 4. History of fibromyalgia. 5. Dyslipidemia. 6. Remote TIA. 7. GERD. 8. Possible early dementia. Plan:  Pacemaker has reached VOLODYMYR. Discussed risks, benefits and alternatives to generator change out and she would like to make arrangements. Plan to hold the Coumadin at least four days prior to procedure. She would like to get this done this month, as her daughter-in-law is going to be having surgery in mid January and they want to have enough time to heal. 
 
 
Past Medical History:  
Diagnosis Date  Bradycardia  Carotid artery stenosis  Chronic atrial fibrillation ([address]  CVA (cerebral vascular accident) ([address]  DDD (degenerative disc disease), cervical   
 Essential hypertension  Fibromyalgia  GERD (gastroesophageal reflux disease)  H/O burning pain in leg   
 right leg, current  Macular degeneration   
 right eye  Mitral valve disorder  Osteopenia  Spinal stenosis  TIA (transient ischemic attack) Current Outpatient Medications Medication Sig Dispense Refill  warfarin (COUMADIN) 5 mg tablet Take 1 Tab by mouth daily. As directed. 90 Tab 3  
 tolterodine ER (DETROL LA) 4 mg ER capsule Take 1 Cap by mouth daily.  90 Cap 3  
  warfarin (COUMADIN) 2.5 mg tablet Take 2 Tabs by mouth daily. As directed  Indications: prevention for a blood clot going to the brain (Patient taking differently: Take 7.5 mg by mouth daily. As directed 7.5 mg  Indications: prevention for a blood clot going to the brain) 90 Tab 6  pantoprazole (PROTONIX) 20 mg tablet Take 1 tablet by mouth once daily 90 Tab 1  
 CALCIUM PO Take 600 mg by mouth daily.  glucosamine HCl/chondroitin ordaz (GLUCOSAMINE-CHONDROITIN PO) Take  by mouth.  acetaminophen (TYLENOL) 325 mg tablet Take 325 mg by mouth every six (6) hours as needed for Pain.  diclofenac (VOLTAREN) 1 % gel Apply 4 g to affected area daily. Indications: joint damage causing pain and loss of function  multivitamin (ONE A DAY) tablet Take 1 Tab by mouth daily. Indications: vitamin deficiency  cholecalciferol, vitamin D3, (VITAMIN D3 PO) Take 1,000 mg by mouth daily.  OTHER Take 1 Caplet by mouth daily. OTC vision supplement [employer] 50[facility] vitamin for Vision  Indications: blurred vision Social History 
 reports that she has never smoked. She has never used smokeless tobacco. 
 reports no history of alcohol use. Family History 
family history includes Heart Failure in her father; Stroke in her mother. Review of Systems Except as stated above include: 
Constitutional: Negative for fever, chills and malaise/fatigue. HEENT: No congestion or recent URI. Gastrointestinal: No nausea, vomiting, abdominal pain, bloody stools. Pulmonary:  Negative except as stated above. Cardiac:  Negative except as stated above. Musculoskeletal: Negative except as stated above. Neurological:  No localized symptoms. Skin:  Negative except as stated above. Psych:  Negative except as stated above. Endocrine:  Negative except as stated above. PHYSICAL EXAM 
BP Readings from Last 3 Encounters:  
12/09/20 120/74  
11/17/20 120/72  
09/10/20 114/64 Pulse Readings from Last 3 Encounters: 12/09/20 (!) 58  
11/17/20 73  
09/10/20 83 Wt Readings from Last 3 Encounters:  
12/09/20 54 kg (119 lb)  
11/17/20 54 kg (119 lb) 11/03/20 54 kg (119 lb) General:   Well developed, well groomed. Head/Neck:   No obvious jugular venous distention No obvious carotid pulsations. No evidence of xanthelasma. Lungs:   No respiratory distress. Clear bilaterally. Heart:  Regular rate and rhythm. Normal S1/S2. Palpation grossly normal. 
  No significant murmurs, rubs or gallops. Pacer pocket intact. Abdomen:   Non-acute abdomen. No obvious pulsations. Extremities:   Intact peripheral pulses. No significant edema. Neurological:   Alert and oriented to person, place, time. No focal neurological deficit visually. Skin:   No obvious rash Blood Pressure Metric: 
Monitor recommended and adjustments stated if needed.

## 2020-12-11 NOTE — TELEPHONE ENCOUNTER
Usual recommendation to get chest x-ray if prior positive PPD.  But in this situation we can order chest x-ray

## 2020-12-11 NOTE — TELEPHONE ENCOUNTER
Pt's daughter wants to know can a chest xray be ordered instead of her mom coming in the office for a ppd. Pt's daughter was told she can get a chest xray or ppd and she feels coming into the office is too much being they would have to come back to get the test read. Pt has an appt Monday at 11:15. Please advise.

## 2020-12-14 NOTE — TELEPHONE ENCOUNTER
Spoke with patient's daughter Paris Warren and she is bringing forms for completion for residential facility following surgery for daughter. Forms to Dr. Ras Lindsey for completion prior to 12/16/20. Also having Chest Xray done today for TB screening required for facility admission.

## 2020-12-15 NOTE — PROGRESS NOTES
X-ray was done to check for PPD screening. Noted incidental finding of thoracic spine compression fracture. Sending her to spine specialist for further recommendations. She does have frequent fall. Further discussion on follow up visit . We can complete her paper work as she is waiting to go to assisted living facility? ?

## 2020-12-16 NOTE — PROGRESS NOTES
Yes two different site. That time was lumbar and this is thoracic. Also rheumatology regarding treatment for osteoporosis? ? Do not see any notes from rheumatology in media? ?  Would you please find more info??

## 2020-12-22 NOTE — PROGRESS NOTES
Verbal order and read back per Alison Anthony, DO  continue Coumadin 7.5mg daily. Recheck 2 weeks.     Left message on machine

## 2020-12-28 PROBLEM — Z45.010 PACEMAKER AT END OF BATTERY LIFE: Status: ACTIVE | Noted: 2020-01-13

## 2020-12-28 NOTE — Clinical Note
TRANSFER - IN REPORT:     Verbal report received from: MARYELLEN Howell. Report consisted of patient's Situation, Background, Assessment and   Recommendations(SBAR). Opportunity for questions and clarification was provided. Assessment completed upon patient's arrival to unit and care assumed. Patient transported with a Cardiac Cath Tech / Patient Care Tech.

## 2020-12-28 NOTE — H&P
Plan pacemaker generator changeout as previously discussed, all questions answered. Plan sedation with versed/fentanyl. History of Present Illness:  80year old female here for follow up. She is accompanied by her daughter-in-law as usual.  Overall she is doing relatively well. No new chest pain, dyspnea, PND, orthopnea, edema. Her functional status is limited due to her DJD and deconditioning. She uses a walker regularly. She is new to the area and lives with her son and daughter-in-law for about the past 18 months when her  passed.     Impression:  1. Single chamber [employer] pacemaker, now at [facility] triggered in September. She is paced approximately 45% of the time. 2. Chronic atrial fibrillation, on Coumadin, with history of falls, but none recently. 3. Chronic deconditioning, using walker. 4. History of fibromyalgia. 5. Dyslipidemia. 6. Remote TIA. 7. GERD. 8. Possible early dementia.     Plan:  Pacemaker has reached VOLODYMYR. Discussed risks, benefits and alternatives to generator change out and she would like to make arrangements. Plan to hold the Coumadin at least four days prior to procedure.   She would like to get this done this month, as her daughter-in-law is going to be having surgery in mid January and they want to have enough time to heal.             Past Medical History:   Diagnosis Date    Bradycardia      Carotid artery stenosis      Chronic atrial fibrillation ([address]      CVA (cerebral vascular accident) ([address]      DDD (degenerative disc disease), cervical      Essential hypertension      Fibromyalgia      GERD (gastroesophageal reflux disease)      H/O burning pain in leg       right leg, current    Macular degeneration       right eye    Mitral valve disorder      Osteopenia      Spinal stenosis      TIA (transient ischemic attack)                  Current Outpatient Medications   Medication Sig Dispense Refill    warfarin (COUMADIN) 5 mg tablet Take 1 Tab by mouth daily. As directed. 90 Tab 3    tolterodine ER (DETROL LA) 4 mg ER capsule Take 1 Cap by mouth daily. 90 Cap 3    warfarin (COUMADIN) 2.5 mg tablet Take 2 Tabs by mouth daily. As directed  Indications: prevention for a blood clot going to the brain (Patient taking differently: Take 7.5 mg by mouth daily. As directed 7.5 mg  Indications: prevention for a blood clot going to the brain) 90 Tab 6    pantoprazole (PROTONIX) 20 mg tablet Take 1 tablet by mouth once daily 90 Tab 1    CALCIUM PO Take 600 mg by mouth daily.        glucosamine HCl/chondroitin ordaz (GLUCOSAMINE-CHONDROITIN PO) Take  by mouth.        acetaminophen (TYLENOL) 325 mg tablet Take 325 mg by mouth every six (6) hours as needed for Pain.        diclofenac (VOLTAREN) 1 % gel Apply 4 g to affected area daily. Indications: joint damage causing pain and loss of function        multivitamin (ONE A DAY) tablet Take 1 Tab by mouth daily. Indications: vitamin deficiency        cholecalciferol, vitamin D3, (VITAMIN D3 PO) Take 1,000 mg by mouth daily.        OTHER Take 1 Caplet by mouth daily. OTC vision supplement [employer] 50[facility] vitamin for Vision  Indications: blurred vision             Social History   reports that she has never smoked. She has never used smokeless tobacco.   reports no history of alcohol use.     Family History  family history includes Heart Failure in her father; Stroke in her mother.     Review of Systems  Except as stated above include:  Constitutional: Negative for fever, chills and malaise/fatigue. HEENT: No congestion or recent URI. Gastrointestinal: No nausea, vomiting, abdominal pain, bloody stools. Pulmonary:  Negative except as stated above. Cardiac:  Negative except as stated above. Musculoskeletal: Negative except as stated above. Neurological:  No localized symptoms. Skin:  Negative except as stated above. Psych:  Negative except as stated above.   Endocrine:  Negative except as stated above.     PHYSICAL EXAM      BP Readings from Last 3 Encounters:   12/09/20 120/74   11/17/20 120/72   09/10/20 114/64          Pulse Readings from Last 3 Encounters:   12/09/20 (!) 58   11/17/20 73   09/10/20 83          Wt Readings from Last 3 Encounters:   12/09/20 54 kg (119 lb)   11/17/20 54 kg (119 lb)   11/03/20 54 kg (119 lb)      General:          Well developed, well groomed. Head/Neck:     No obvious jugular venous distention                           No obvious carotid pulsations. No evidence of xanthelasma. Lungs:             No respiratory distress. Clear bilaterally. Heart:              Regular rate and rhythm. Normal S1/S2. Palpation grossly normal.                          No significant murmurs, rubs or gallops. Pacer pocket intact. Abdomen:        Non-acute abdomen. No obvious pulsations. Extremities:     Intact peripheral pulses. No significant edema. Neurological:   Alert and oriented to person, place, time. No focal neurological deficit visually.   Skin:                No obvious rash     Blood Pressure Metric:  Monitor recommended and adjustments stated if needed.         Electronically signed by Chapincito Riddle MD at 12/09/20 1540

## 2020-12-28 NOTE — PROGRESS NOTES
1220: Cath holding summary     Patient escorted to cath holding from waiting area ambulatory, alert and oriented x 4, voicing no complaints. Changed into gown and placed on monitor. NPO since MN. Lab results, med rec and H&P reviewed on chart. PIV x 1 inserted without difficulty.

## 2020-12-28 NOTE — DISCHARGE INSTRUCTIONS
Disposition:  Will need follow-up with device/wound check in 7 days in my office. Please contact office at [phone] to confirm appointment. Main Office:    [address]    Restrictions: For affected arm:  No lifting greater than 10 lbs or lifting elbow above shoulder for 4 weeks. Keep incision clean and dry for a total of 72 hours after procedure. Remove dressing in 7 days if not already removed. No hot tubs or pools for 2 weeks. OK to shower with \"pat\" dry incision after 72 hours. No driving ideally for 1 week due to concern for airbag, minimize for 1 additional week.

## 2020-12-28 NOTE — PROGRESS NOTES
TRANSFER - IN REPORT:    Verbal report received from [address] [place] [address],[address] (name) on Venetta Ray  being received from EP Lab (unit) for routine post - op      Report consisted of patients Situation, Background, Assessment and   Recommendations(SBAR). Information from the following report(s) SBAR and Procedure Summary was reviewed with the receiving nurse. Opportunity for questions and clarification was provided. Assessment completed upon patients arrival to unit and care assumed.

## 2020-12-28 NOTE — Clinical Note
TRANSFER - OUT REPORT:     Verbal report given to: MARYELLEN Howell. Report consisted of patient's Situation, Background, Assessment and   Recommendations(SBAR). Opportunity for questions and clarification was provided. Patient transported with a Cardiac Cath Tech / Patient Care Tech. Patient transported to: [address]

## 2020-12-28 NOTE — Clinical Note
Contrast Dose Calculator:   Patient's age: 80.   Patient's sex: Female. Patient weight (kg) = 54.2. Creatinine level (mg/dL) = 0.66. Creatinine clearance (mL/min): 52.   Contrast concentration (mg/mL) = 300. MACD = 300 mL. Max Contrast dose per Creatinine Cl calculator = 117 mL.

## 2020-12-30 NOTE — TELEPHONE ENCOUNTER
Called the patient's daughter back. Explained that some bruising is expected. Per Amanda Melissa, it is ok for the patient to resume the Coumadin as directed. Daughter indicated clear understanding.

## 2020-12-30 NOTE — TELEPHONE ENCOUNTER
called and left message to advise that forms will be completed by tomorrow and faxed. Dr. Eileen Lomas will be completing forms outside the office and will drop off tomorrow to be sent. I will advise Mae to keep eye out for forms.

## 2021-01-01 ENCOUNTER — TELEPHONE ANTICOAG (OUTPATIENT)
Dept: CARDIOLOGY CLINIC | Age: 86
End: 2021-01-01

## 2021-01-01 ENCOUNTER — CLINICAL SUPPORT (OUTPATIENT)
Dept: CARDIOLOGY CLINIC | Age: 86
End: 2021-01-01
Payer: MEDICARE

## 2021-01-01 DIAGNOSIS — Z95.0 PACEMAKER: Primary | ICD-10-CM

## 2021-01-01 DIAGNOSIS — I48.20 CHRONIC A-FIB (HCC): Primary | ICD-10-CM

## 2021-01-01 DIAGNOSIS — I48.91 ATRIAL FIBRILLATION, UNSPECIFIED TYPE (HCC): ICD-10-CM

## 2021-01-01 LAB — INR, EXTERNAL: 1.6

## 2021-01-01 PROCEDURE — 93279 PRGRMG DEV EVAL PM/LDLS PM: CPT | Performed by: INTERNAL MEDICINE

## 2021-01-07 NOTE — PROGRESS NOTES
Verbal order and read back per Clover Hurtado, NP  continue Coumadin 7.5mg daily. Recheck 2 weeks.  (Faxed instructions to Joshua Marsh at [address] [place] [address] )

## 2021-01-11 NOTE — PROGRESS NOTES
I have personally seen and evaluated the device findings. Interrogation reviewed and I agree with assessment. Joy Pizano

## 2021-01-12 NOTE — PROGRESS NOTES
I have personally seen and evaluated the device findings. Interrogation reviewed and I agree with assessment. Dina Romero

## 2021-02-11 ENCOUNTER — TELEPHONE (OUTPATIENT)
Dept: FAMILY MEDICINE CLINIC | Age: 86
End: 2021-02-11

## 2021-02-11 NOTE — TELEPHONE ENCOUNTER
Upon chart review I viewed [facility] records that had a Death listed. Was unable to locate documentation and called to [facility] and spoke with ER MD and he advised that she did indeed pass after patient was placed on comfort care by family. Called and advised Damian Joyce that order would not be completed as patient had passed. Advised that they were probably input with the expectation that the patient would recover and return to [facility]. Dr. Brad Mcclure has been notified.

## 2022-05-12 NOTE — PROGRESS NOTES
1. Have you been to the ER, urgent care clinic since your last visit? Hospitalized since your last visit? No 
 
2. Have you seen or consulted any other health care providers outside of the [address] [place] [address] since your last visit? Include any pap smears or colon screening. No 
 
Chief Complaint Patient presents with  New Patient  Foot Pain  
  right foot pain  Leg Pain  
  right leg pain  Referral Request  
  podiatry and cardiology - needs warfarin check  Urinary Frequency  
  urinary frequency and urinary incontinence  Medication Evaluation  
  wants to discuss which vitamins to take Patient had flu vaccine on 10/14/19 12-May-2022 14:47

## (undated) DEVICE — PACEMAKER PACK: Brand: MEDLINE INDUSTRIES, INC.

## (undated) DEVICE — DRAPE SURG W25XL50IN E OPN CIR BND BG

## (undated) DEVICE — PLASMABLADE PS200-040 4.0: Brand: PLASMABLADE™

## (undated) DEVICE — REM POLYHESIVE ADULT PATIENT RETURN ELECTRODE: Brand: VALLEYLAB

## (undated) DEVICE — DRAPE STRL ANGIO W/ 2 FLD COLLCTN PCH 86X135 218X343 CM 2

## (undated) DEVICE — KENDALL RADIOLUCENT FOAM MONITORING ELECTRODE RECTANGULAR SHAPE: Brand: KENDALL

## (undated) DEVICE — SUTURE MCRYL SZ 4 0 L18IN ABSRB VLT PS 1 L24MM 3 8 CIR REV Y682H

## (undated) DEVICE — MEDI-TRACE CADENCE ADULT, DEFIBRILLATION ELECTRODE -RTS  (10 PR/PK) - PHYSIO-CONTROL: Brand: MEDI-TRACE CADENCE

## (undated) DEVICE — LIMB HOLDER, WRIST/ANKLE: Brand: DEROYAL

## (undated) DEVICE — CABLE PACE ALGTR CLP SAF 12FT --

## (undated) DEVICE — SUTURE ABSORBABLE BRAIDED 2-0 CT-1 27 IN UD VICRYL J259H